# Patient Record
Sex: MALE | Race: WHITE | Employment: OTHER | URBAN - METROPOLITAN AREA
[De-identification: names, ages, dates, MRNs, and addresses within clinical notes are randomized per-mention and may not be internally consistent; named-entity substitution may affect disease eponyms.]

---

## 2022-02-18 ENCOUNTER — APPOINTMENT (OUTPATIENT)
Dept: CT IMAGING | Age: 32
DRG: 552 | End: 2022-02-18
Payer: MEDICARE

## 2022-02-18 ENCOUNTER — HOSPITAL ENCOUNTER (EMERGENCY)
Age: 32
Discharge: HOME | DRG: 552 | End: 2022-02-18
Payer: MEDICARE

## 2022-02-18 VITALS
DIASTOLIC BLOOD PRESSURE: 76 MMHG | RESPIRATION RATE: 16 BRPM | HEART RATE: 76 BPM | HEIGHT: 72 IN | OXYGEN SATURATION: 97 % | WEIGHT: 200 LBS | TEMPERATURE: 98 F | BODY MASS INDEX: 27.09 KG/M2 | SYSTOLIC BLOOD PRESSURE: 127 MMHG

## 2022-02-18 DIAGNOSIS — M54.2 NECK PAIN: Primary | ICD-10-CM

## 2022-02-18 DIAGNOSIS — M48.02 FORAMINAL STENOSIS OF CERVICAL REGION: ICD-10-CM

## 2022-02-18 DIAGNOSIS — M54.12 CERVICAL RADICULOPATHY: ICD-10-CM

## 2022-02-18 PROCEDURE — 96372 THER/PROPH/DIAG INJ SC/IM: CPT

## 2022-02-18 PROCEDURE — 72125 CT NECK SPINE W/O DYE: CPT

## 2022-02-18 PROCEDURE — 99283 EMERGENCY DEPT VISIT LOW MDM: CPT

## 2022-02-18 PROCEDURE — 6360000002 HC RX W HCPCS: Performed by: PHYSICIAN ASSISTANT

## 2022-02-18 RX ORDER — CYCLOBENZAPRINE HCL 10 MG
10 TABLET ORAL 3 TIMES DAILY PRN
Qty: 21 TABLET | Refills: 0 | Status: ON HOLD | OUTPATIENT
Start: 2022-02-18 | End: 2022-02-23 | Stop reason: SDUPTHER

## 2022-02-18 RX ORDER — HYDROCODONE BITARTRATE AND ACETAMINOPHEN 5; 325 MG/1; MG/1
1 TABLET ORAL EVERY 6 HOURS PRN
Qty: 12 TABLET | Refills: 0 | Status: ON HOLD | OUTPATIENT
Start: 2022-02-18 | End: 2022-02-23 | Stop reason: HOSPADM

## 2022-02-18 RX ORDER — KETOROLAC TROMETHAMINE 30 MG/ML
30 INJECTION, SOLUTION INTRAMUSCULAR; INTRAVENOUS ONCE
Status: COMPLETED | OUTPATIENT
Start: 2022-02-18 | End: 2022-02-18

## 2022-02-18 RX ORDER — IBUPROFEN 600 MG/1
600 TABLET ORAL 3 TIMES DAILY PRN
Qty: 30 TABLET | Refills: 0 | Status: ON HOLD | OUTPATIENT
Start: 2022-02-18 | End: 2022-02-23 | Stop reason: HOSPADM

## 2022-02-18 RX ORDER — ORPHENADRINE CITRATE 30 MG/ML
60 INJECTION INTRAMUSCULAR; INTRAVENOUS ONCE
Status: COMPLETED | OUTPATIENT
Start: 2022-02-18 | End: 2022-02-18

## 2022-02-18 RX ORDER — PREDNISONE 10 MG/1
TABLET ORAL
Qty: 20 TABLET | Refills: 0 | Status: ON HOLD | OUTPATIENT
Start: 2022-02-18 | End: 2022-02-23 | Stop reason: HOSPADM

## 2022-02-18 RX ADMIN — KETOROLAC TROMETHAMINE 30 MG: 30 INJECTION, SOLUTION INTRAMUSCULAR at 18:47

## 2022-02-18 RX ADMIN — ORPHENADRINE CITRATE 60 MG: 30 INJECTION INTRAMUSCULAR; INTRAVENOUS at 18:48

## 2022-02-18 ASSESSMENT — PAIN SCALES - GENERAL
PAINLEVEL_OUTOF10: 5
PAINLEVEL_OUTOF10: 9
PAINLEVEL_OUTOF10: 10

## 2022-02-18 ASSESSMENT — PAIN DESCRIPTION - DESCRIPTORS: DESCRIPTORS: ACHING

## 2022-02-18 ASSESSMENT — PAIN DESCRIPTION - ONSET: ONSET: ON-GOING

## 2022-02-18 ASSESSMENT — PAIN DESCRIPTION - PROGRESSION: CLINICAL_PROGRESSION: GRADUALLY WORSENING

## 2022-02-18 ASSESSMENT — PAIN DESCRIPTION - PAIN TYPE: TYPE: ACUTE PAIN

## 2022-02-18 ASSESSMENT — PAIN - FUNCTIONAL ASSESSMENT: PAIN_FUNCTIONAL_ASSESSMENT: 0-10

## 2022-02-18 ASSESSMENT — PAIN DESCRIPTION - LOCATION: LOCATION: NECK

## 2022-02-18 ASSESSMENT — PAIN DESCRIPTION - FREQUENCY: FREQUENCY: CONTINUOUS

## 2022-02-18 NOTE — ED PROVIDER NOTES
[facility]                                                                                                                                    Department of Emergency Medicine   ED  Provider Note  Admit Date/RoomTime: 2/18/2022  5:56 PM  ED Room: 06/06        HPI:  2/18/22,   Time: 6:28 PM KEL Welsh is a 33 y.o. male presenting to the ED for neck pain, beginning 3 days ago. The complaint has been persistent, severe in severity, and worsened by any movement of head/neck. The patient states that his pain began on Tuesday out of the blue. He reports that it started before bed and then got progressively worse over the past few days. He states that the pain is in the left scapular region and the lower portion of his neck. It does radiate down his left upper extremity. He denies any fall or trauma. No prior history of neck pain. Denies any weakness. His pain is aggravated with any movement. Reports mild numbness left UE. States he has been using Tylenol for pain without relief. Feels fine otherwise. Denies fever, chills or vomiting. ROS:     Constitutional: Negative for fever and chills  HENT: Negative for ear pain, sore throat and sinus pressure  Eyes: Negative for pain, discharge and redness  Respiratory:  Negative for shortness of breath, cough and wheezing  Cardiovascular: Negative for CP, edema or palpitations  Gastrointestinal: Negative for nausea, vomiting, diarrhea and abdominal distention  Genitourinary: Negative for dysuria and frequency  Musculoskeletal:  See HPI  Skin: Negative for rash and wound  Neurological: Negative for weakness and headaches  Hematological: Negative for adenopathy    All other systems reviewed and are negative      -------------------------------- PAST HISTORY ----------------------------------  Past Medical History:  has a past medical history of Crohn disease ([facility] [address]     Past Surgical History:  has no past surgical history on file.    Social History:  reports that he has been smoking. He has been smoking about 1.00 pack per day. He has never used smokeless tobacco. He reports current alcohol use. He reports current drug use. Drug: Marijuana Jimmy Amador). Family History: family history is not on file. The patients home medications have been reviewed. Allergies: Patient has no known allergies. --------------------------------- RESULTS ------------------------------------------  All laboratory and radiology results have been personally reviewed by myself   LABS:  No results found for this visit on 02/18/22. RADIOLOGY:  Interpreted by Radiologist.  MelitonAbigail Narayan GameMaki   Final Result   1. No acute fracture or subluxation. 2. Left neural foraminal narrowing at C5-6 secondary to uncovertebral   hypertrophy.             ----------------- NURSING NOTES AND VITALS REVIEWED ---------------   The nursing notes within the ED encounter and vital signs as below have been reviewed. /82   Pulse 71   Temp 97.7 °F (36.5 °C) (Oral)   Resp 16   Ht 6' (1.829 m)   Wt 200 lb (90.7 kg)   SpO2 97%   BMI 27.12 kg/m²   Oxygen Saturation Interpretation: Normal      --------------------------------PHYSICAL EXAM------------------------------------      Constitutional/General: Alert and oriented x3, well appearing, non toxic in NAD  Head: NC/AT  Eyes: PERRL, EOMI  Mouth: Oropharynx clear, handling secretions, no trismus  Neck: Supple, full ROM, no meningeal signs  Pulmonary: Lungs clear to auscultation bilaterally, no wheezes, rales, or rhonchi. Not in respiratory distress  Cardiovascular:  Regular rate and rhythm, no murmurs, gallops, or rubs. 2+ distal pulses  Abdomen: Soft, + BS. No distension. Nontender. No palpable rigidity, rebound or guarding  Extremities: Moves all extremities x 4. Warm and well perfused  Skin: warm and dry without rash  Neurologic: GCS 15,  Intact.   No focal deficits  Psych: Normal Affect      ------------------------ ED COURSE/MEDICAL DECISION MAKING----------------------  Medications   ketorolac (TORADOL) injection 30 mg (30 mg IntraMUSCular Given 2/18/22 1847)   orphenadrine (NORFLEX) injection 60 mg (60 mg IntraMUSCular Given 2/18/22 1848)         Medical Decision Making:    Pt given Toradol and Decadron here. Reviewed CT findings. He does have left neuro foraminal narrowing at C 5-6. Likely will need MRI if pain persists. Plan steroids, muscle relaxors and NSAIDs for home. Needs to F/U PCP. Discussed need for follow-up       Counseling: The emergency provider has spoken with the patient and discussed todays results, in addition to providing specific details for the plan of care and counseling regarding the diagnosis and prognosis. Questions are answered at this time and they are agreeable with the plan.      ------------------------ IMPRESSION AND DISPOSITION -------------------------------    IMPRESSION  1. Neck pain    2. Cervical radiculopathy    3.  Foraminal stenosis of cervical region        DISPOSITION  Disposition: Discharge to home  Patient condition is stable                   Lydia Alejo PA-C  02/18/22 1918

## 2022-02-20 ENCOUNTER — HOSPITAL ENCOUNTER (INPATIENT)
Age: 32
LOS: 3 days | Discharge: HOME | DRG: 552 | End: 2022-02-23
Attending: EMERGENCY MEDICINE | Admitting: INTERNAL MEDICINE
Payer: MEDICARE

## 2022-02-20 ENCOUNTER — APPOINTMENT (OUTPATIENT)
Dept: MRI IMAGING | Age: 32
DRG: 552 | End: 2022-02-20
Payer: MEDICARE

## 2022-02-20 ENCOUNTER — APPOINTMENT (OUTPATIENT)
Dept: CT IMAGING | Age: 32
DRG: 552 | End: 2022-02-20
Payer: MEDICARE

## 2022-02-20 ENCOUNTER — APPOINTMENT (OUTPATIENT)
Dept: GENERAL RADIOLOGY | Age: 32
DRG: 552 | End: 2022-02-20
Payer: MEDICARE

## 2022-02-20 DIAGNOSIS — M54.12 CERVICAL RADICULOPATHY: Primary | ICD-10-CM

## 2022-02-20 PROBLEM — M54.9 BACK PAIN: Status: ACTIVE | Noted: 2022-02-20

## 2022-02-20 PROBLEM — R29.898 UPPER EXTREMITY WEAKNESS: Status: ACTIVE | Noted: 2022-02-20

## 2022-02-20 LAB
ALBUMIN SERPL-MCNC: 4.9 G/DL (ref 3.5–5.2)
ALP BLD-CCNC: 58 U/L (ref 40–129)
ALT SERPL-CCNC: 11 U/L (ref 0–40)
ANION GAP SERPL CALCULATED.3IONS-SCNC: 11 MMOL/L (ref 7–16)
ANION GAP SERPL CALCULATED.3IONS-SCNC: 14 MMOL/L (ref 7–16)
AST SERPL-CCNC: 13 U/L (ref 0–39)
BASOPHILS ABSOLUTE: 0.02 E9/L (ref 0–0.2)
BASOPHILS ABSOLUTE: 0.03 E9/L (ref 0–0.2)
BASOPHILS RELATIVE PERCENT: 0.2 % (ref 0–2)
BASOPHILS RELATIVE PERCENT: 0.3 % (ref 0–2)
BILIRUB SERPL-MCNC: 0.2 MG/DL (ref 0–1.2)
BUN BLDV-MCNC: 13 MG/DL (ref 6–20)
BUN BLDV-MCNC: 16 MG/DL (ref 6–20)
C-REACTIVE PROTEIN: 0.3 MG/DL (ref 0–0.4)
CALCIUM SERPL-MCNC: 9.5 MG/DL (ref 8.6–10.2)
CALCIUM SERPL-MCNC: 9.6 MG/DL (ref 8.6–10.2)
CHLORIDE BLD-SCNC: 103 MMOL/L (ref 98–107)
CHLORIDE BLD-SCNC: 105 MMOL/L (ref 98–107)
CO2: 21 MMOL/L (ref 22–29)
CO2: 24 MMOL/L (ref 22–29)
CREAT SERPL-MCNC: 0.7 MG/DL (ref 0.7–1.2)
CREAT SERPL-MCNC: 0.8 MG/DL (ref 0.7–1.2)
EOSINOPHILS ABSOLUTE: 0.01 E9/L (ref 0.05–0.5)
EOSINOPHILS ABSOLUTE: 0.05 E9/L (ref 0.05–0.5)
EOSINOPHILS RELATIVE PERCENT: 0.1 % (ref 0–6)
EOSINOPHILS RELATIVE PERCENT: 0.4 % (ref 0–6)
GFR AFRICAN AMERICAN: >60
GFR AFRICAN AMERICAN: >60
GFR NON-AFRICAN AMERICAN: >60 ML/MIN/1.73
GFR NON-AFRICAN AMERICAN: >60 ML/MIN/1.73
GLUCOSE BLD-MCNC: 112 MG/DL (ref 74–99)
GLUCOSE BLD-MCNC: 128 MG/DL (ref 74–99)
HCT VFR BLD CALC: 40.4 % (ref 37–54)
HCT VFR BLD CALC: 44.5 % (ref 37–54)
HEMOGLOBIN: 13.8 G/DL (ref 12.5–16.5)
HEMOGLOBIN: 14.9 G/DL (ref 12.5–16.5)
IMMATURE GRANULOCYTES #: 0.03 E9/L
IMMATURE GRANULOCYTES #: 0.08 E9/L
IMMATURE GRANULOCYTES %: 0.3 % (ref 0–5)
IMMATURE GRANULOCYTES %: 0.6 % (ref 0–5)
LYMPHOCYTES ABSOLUTE: 1.09 E9/L (ref 1.5–4)
LYMPHOCYTES ABSOLUTE: 2.06 E9/L (ref 1.5–4)
LYMPHOCYTES RELATIVE PERCENT: 18.5 % (ref 20–42)
LYMPHOCYTES RELATIVE PERCENT: 8.2 % (ref 20–42)
MCH RBC QN AUTO: 30.4 PG (ref 26–35)
MCH RBC QN AUTO: 31 PG (ref 26–35)
MCHC RBC AUTO-ENTMCNC: 33.5 % (ref 32–34.5)
MCHC RBC AUTO-ENTMCNC: 34.2 % (ref 32–34.5)
MCV RBC AUTO: 90.8 FL (ref 80–99.9)
MCV RBC AUTO: 90.8 FL (ref 80–99.9)
MONOCYTES ABSOLUTE: 0.19 E9/L (ref 0.1–0.95)
MONOCYTES ABSOLUTE: 0.74 E9/L (ref 0.1–0.95)
MONOCYTES RELATIVE PERCENT: 1.4 % (ref 2–12)
MONOCYTES RELATIVE PERCENT: 6.7 % (ref 2–12)
NEUTROPHILS ABSOLUTE: 11.89 E9/L (ref 1.8–7.3)
NEUTROPHILS ABSOLUTE: 8.21 E9/L (ref 1.8–7.3)
NEUTROPHILS RELATIVE PERCENT: 73.8 % (ref 43–80)
NEUTROPHILS RELATIVE PERCENT: 89.5 % (ref 43–80)
PDW BLD-RTO: 11.9 FL (ref 11.5–15)
PDW BLD-RTO: 11.9 FL (ref 11.5–15)
PLATELET # BLD: 245 E9/L (ref 130–450)
PLATELET # BLD: 274 E9/L (ref 130–450)
PMV BLD AUTO: 11.5 FL (ref 7–12)
PMV BLD AUTO: 11.8 FL (ref 7–12)
POTASSIUM SERPL-SCNC: 3.8 MMOL/L (ref 3.5–5)
POTASSIUM SERPL-SCNC: 4.2 MMOL/L (ref 3.5–5)
RBC # BLD: 4.45 E12/L (ref 3.8–5.8)
RBC # BLD: 4.9 E12/L (ref 3.8–5.8)
SEDIMENTATION RATE, ERYTHROCYTE: 17 MM/HR (ref 0–15)
SODIUM BLD-SCNC: 138 MMOL/L (ref 132–146)
SODIUM BLD-SCNC: 140 MMOL/L (ref 132–146)
TOTAL CK: 204 U/L (ref 20–200)
TOTAL PROTEIN: 7.8 G/DL (ref 6.4–8.3)
WBC # BLD: 11.1 E9/L (ref 4.5–11.5)
WBC # BLD: 13.3 E9/L (ref 4.5–11.5)

## 2022-02-20 PROCEDURE — 2580000003 HC RX 258: Performed by: STUDENT IN AN ORGANIZED HEALTH CARE EDUCATION/TRAINING PROGRAM

## 2022-02-20 PROCEDURE — 6370000000 HC RX 637 (ALT 250 FOR IP): Performed by: EMERGENCY MEDICINE

## 2022-02-20 PROCEDURE — 86140 C-REACTIVE PROTEIN: CPT

## 2022-02-20 PROCEDURE — 80053 COMPREHEN METABOLIC PANEL: CPT

## 2022-02-20 PROCEDURE — 85025 COMPLETE CBC W/AUTO DIFF WBC: CPT

## 2022-02-20 PROCEDURE — 85651 RBC SED RATE NONAUTOMATED: CPT

## 2022-02-20 PROCEDURE — 72146 MRI CHEST SPINE W/O DYE: CPT

## 2022-02-20 PROCEDURE — 36415 COLL VENOUS BLD VENIPUNCTURE: CPT

## 2022-02-20 PROCEDURE — 71045 X-RAY EXAM CHEST 1 VIEW: CPT

## 2022-02-20 PROCEDURE — 99285 EMERGENCY DEPT VISIT HI MDM: CPT

## 2022-02-20 PROCEDURE — 80048 BASIC METABOLIC PNL TOTAL CA: CPT

## 2022-02-20 PROCEDURE — 6370000000 HC RX 637 (ALT 250 FOR IP): Performed by: STUDENT IN AN ORGANIZED HEALTH CARE EDUCATION/TRAINING PROGRAM

## 2022-02-20 PROCEDURE — 99222 1ST HOSP IP/OBS MODERATE 55: CPT | Performed by: NEUROLOGICAL SURGERY

## 2022-02-20 PROCEDURE — 6360000002 HC RX W HCPCS: Performed by: EMERGENCY MEDICINE

## 2022-02-20 PROCEDURE — 6360000002 HC RX W HCPCS: Performed by: STUDENT IN AN ORGANIZED HEALTH CARE EDUCATION/TRAINING PROGRAM

## 2022-02-20 PROCEDURE — 96374 THER/PROPH/DIAG INJ IV PUSH: CPT

## 2022-02-20 PROCEDURE — 1200000000 HC SEMI PRIVATE

## 2022-02-20 PROCEDURE — 6370000000 HC RX 637 (ALT 250 FOR IP): Performed by: INTERNAL MEDICINE

## 2022-02-20 PROCEDURE — 82550 ASSAY OF CK (CPK): CPT

## 2022-02-20 PROCEDURE — 96375 TX/PRO/DX INJ NEW DRUG ADDON: CPT

## 2022-02-20 PROCEDURE — 6360000004 HC RX CONTRAST MEDICATION: Performed by: RADIOLOGY

## 2022-02-20 PROCEDURE — 96372 THER/PROPH/DIAG INJ SC/IM: CPT

## 2022-02-20 PROCEDURE — 71275 CT ANGIOGRAPHY CHEST: CPT

## 2022-02-20 PROCEDURE — 72141 MRI NECK SPINE W/O DYE: CPT

## 2022-02-20 RX ORDER — KETOROLAC TROMETHAMINE 30 MG/ML
15 INJECTION, SOLUTION INTRAMUSCULAR; INTRAVENOUS ONCE
Status: COMPLETED | OUTPATIENT
Start: 2022-02-20 | End: 2022-02-20

## 2022-02-20 RX ORDER — CYCLOBENZAPRINE HCL 10 MG
10 TABLET ORAL 3 TIMES DAILY PRN
Status: DISCONTINUED | OUTPATIENT
Start: 2022-02-20 | End: 2022-02-23 | Stop reason: HOSPADM

## 2022-02-20 RX ORDER — SODIUM CHLORIDE 9 MG/ML
25 INJECTION, SOLUTION INTRAVENOUS PRN
Status: DISCONTINUED | OUTPATIENT
Start: 2022-02-20 | End: 2022-02-23 | Stop reason: HOSPADM

## 2022-02-20 RX ORDER — ONDANSETRON 4 MG/1
4 TABLET, ORALLY DISINTEGRATING ORAL EVERY 8 HOURS PRN
Status: DISCONTINUED | OUTPATIENT
Start: 2022-02-20 | End: 2022-02-23 | Stop reason: HOSPADM

## 2022-02-20 RX ORDER — ORPHENADRINE CITRATE 30 MG/ML
60 INJECTION INTRAMUSCULAR; INTRAVENOUS ONCE
Status: COMPLETED | OUTPATIENT
Start: 2022-02-20 | End: 2022-02-20

## 2022-02-20 RX ORDER — POLYETHYLENE GLYCOL 3350 17 G/17G
17 POWDER, FOR SOLUTION ORAL DAILY PRN
Status: DISCONTINUED | OUTPATIENT
Start: 2022-02-20 | End: 2022-02-23 | Stop reason: HOSPADM

## 2022-02-20 RX ORDER — LORAZEPAM 2 MG/ML
1 INJECTION INTRAMUSCULAR ONCE
Status: COMPLETED | OUTPATIENT
Start: 2022-02-20 | End: 2022-02-20

## 2022-02-20 RX ORDER — ACETAMINOPHEN 325 MG/1
650 TABLET ORAL EVERY 6 HOURS PRN
Status: DISCONTINUED | OUTPATIENT
Start: 2022-02-20 | End: 2022-02-23 | Stop reason: HOSPADM

## 2022-02-20 RX ORDER — SODIUM CHLORIDE 0.9 % (FLUSH) 0.9 %
5-40 SYRINGE (ML) INJECTION PRN
Status: DISCONTINUED | OUTPATIENT
Start: 2022-02-20 | End: 2022-02-23 | Stop reason: HOSPADM

## 2022-02-20 RX ORDER — SODIUM CHLORIDE 0.9 % (FLUSH) 0.9 %
5-40 SYRINGE (ML) INJECTION EVERY 12 HOURS SCHEDULED
Status: DISCONTINUED | OUTPATIENT
Start: 2022-02-20 | End: 2022-02-23 | Stop reason: HOSPADM

## 2022-02-20 RX ORDER — ACETAMINOPHEN 650 MG/1
650 SUPPOSITORY RECTAL EVERY 6 HOURS PRN
Status: DISCONTINUED | OUTPATIENT
Start: 2022-02-20 | End: 2022-02-23 | Stop reason: HOSPADM

## 2022-02-20 RX ORDER — HYDROCODONE BITARTRATE AND ACETAMINOPHEN 5; 325 MG/1; MG/1
1 TABLET ORAL ONCE
Status: COMPLETED | OUTPATIENT
Start: 2022-02-20 | End: 2022-02-20

## 2022-02-20 RX ORDER — ONDANSETRON 2 MG/ML
4 INJECTION INTRAMUSCULAR; INTRAVENOUS EVERY 6 HOURS PRN
Status: DISCONTINUED | OUTPATIENT
Start: 2022-02-20 | End: 2022-02-23 | Stop reason: HOSPADM

## 2022-02-20 RX ORDER — DEXAMETHASONE SODIUM PHOSPHATE 10 MG/ML
10 INJECTION INTRAMUSCULAR; INTRAVENOUS ONCE
Status: COMPLETED | OUTPATIENT
Start: 2022-02-20 | End: 2022-02-20

## 2022-02-20 RX ORDER — NICOTINE 21 MG/24HR
1 PATCH, TRANSDERMAL 24 HOURS TRANSDERMAL DAILY
Status: DISCONTINUED | OUTPATIENT
Start: 2022-02-20 | End: 2022-02-22

## 2022-02-20 RX ADMIN — CYCLOBENZAPRINE 10 MG: 10 TABLET, FILM COATED ORAL at 16:28

## 2022-02-20 RX ADMIN — LORAZEPAM 1 MG: 2 INJECTION INTRAMUSCULAR; INTRAVENOUS at 04:37

## 2022-02-20 RX ADMIN — KETOROLAC TROMETHAMINE 15 MG: 30 INJECTION, SOLUTION INTRAMUSCULAR; INTRAVENOUS at 21:54

## 2022-02-20 RX ADMIN — SODIUM CHLORIDE, PRESERVATIVE FREE 10 ML: 5 INJECTION INTRAVENOUS at 21:55

## 2022-02-20 RX ADMIN — IOPAMIDOL 90 ML: 755 INJECTION, SOLUTION INTRAVENOUS at 12:13

## 2022-02-20 RX ADMIN — DEXAMETHASONE SODIUM PHOSPHATE 10 MG: 10 INJECTION INTRAMUSCULAR; INTRAVENOUS at 11:24

## 2022-02-20 RX ADMIN — ENOXAPARIN SODIUM 40 MG: 100 INJECTION SUBCUTANEOUS at 16:28

## 2022-02-20 RX ADMIN — HYDROCODONE BITARTRATE AND ACETAMINOPHEN 1 TABLET: 5; 325 TABLET ORAL at 04:38

## 2022-02-20 RX ADMIN — CYCLOBENZAPRINE 10 MG: 10 TABLET, FILM COATED ORAL at 21:54

## 2022-02-20 RX ADMIN — HYDROCODONE BITARTRATE AND ACETAMINOPHEN 1 TABLET: 5; 325 TABLET ORAL at 20:34

## 2022-02-20 RX ADMIN — KETOROLAC TROMETHAMINE 15 MG: 30 INJECTION, SOLUTION INTRAMUSCULAR at 04:37

## 2022-02-20 RX ADMIN — ORPHENADRINE CITRATE 60 MG: 60 INJECTION INTRAMUSCULAR; INTRAVENOUS at 11:31

## 2022-02-20 ASSESSMENT — ENCOUNTER SYMPTOMS
EYE REDNESS: 0
SINUS PRESSURE: 0
BACK PAIN: 1
VOMITING: 0
WHEEZING: 0
SHORTNESS OF BREATH: 0
COUGH: 0
NAUSEA: 0
ABDOMINAL PAIN: 0
EYE DISCHARGE: 0
DIARRHEA: 0
SORE THROAT: 0
EYE PAIN: 0

## 2022-02-20 ASSESSMENT — PAIN DESCRIPTION - FREQUENCY: FREQUENCY: CONTINUOUS

## 2022-02-20 ASSESSMENT — PAIN SCALES - GENERAL
PAINLEVEL_OUTOF10: 9
PAINLEVEL_OUTOF10: 0
PAINLEVEL_OUTOF10: 10
PAINLEVEL_OUTOF10: 10

## 2022-02-20 ASSESSMENT — PAIN - FUNCTIONAL ASSESSMENT: PAIN_FUNCTIONAL_ASSESSMENT: PREVENTS OR INTERFERES SOME ACTIVE ACTIVITIES AND ADLS

## 2022-02-20 ASSESSMENT — PAIN DESCRIPTION - ONSET: ONSET: ON-GOING

## 2022-02-20 ASSESSMENT — PAIN DESCRIPTION - LOCATION: LOCATION: NECK

## 2022-02-20 ASSESSMENT — PAIN DESCRIPTION - PROGRESSION: CLINICAL_PROGRESSION: NOT CHANGED

## 2022-02-20 ASSESSMENT — PAIN DESCRIPTION - PAIN TYPE: TYPE: ACUTE PAIN

## 2022-02-20 ASSESSMENT — PAIN DESCRIPTION - ORIENTATION: ORIENTATION: LEFT

## 2022-02-20 NOTE — ED NOTES
Bed: 17B-17  Expected date:   Expected time:   Means of arrival:   Comments:  lanes Maron Cough, RN  02/20/22 0095

## 2022-02-20 NOTE — ED PROVIDER NOTES
Chief Complaint   Patient presents with    Back Pain     Seen at [facility] ED yesterday where discovered T5 bone on bone. Today experiencing numbness of lt arm, neck pain, and back pain, having trouble lifting lt arm up. 77-year-old male with noncontributory past medical history presenting today with worsening neck, upper back, left arm pain and numbness. The pain started on Tuesday before bed with no inciting event, it is not worsening since then, nothing is made it better, any movement makes it worse, associated with generalized numbness and tingling. He is not experiencing weakness but it does hurt on range of motion. No sensation changes and radial pulses equal.  He denies ever experiencing trauma to his neck or back, he has never experienced this before. He works intermittently as a  and is right-handed. He was seen last time emergency department on 2/18 for the same complaint, CT neck was done that showed neuroforaminal narrowing at C5 and C6. He was discharged with steroids and muscle relaxers, he states that he has only had time to take one dose after discharge but the pain is worsening and he came back in for reevaluation. Review of Systems   Constitutional: Negative for chills and fever. HENT: Negative for ear pain, sinus pressure and sore throat. Eyes: Negative for pain, discharge and redness. Respiratory: Negative for cough, shortness of breath and wheezing. Cardiovascular: Negative for chest pain. Gastrointestinal: Negative for abdominal pain, diarrhea, nausea and vomiting. Genitourinary: Negative for dysuria and frequency. Musculoskeletal: Positive for back pain and neck pain. Negative for arthralgias. Left arm pain, numbness, tingling   Skin: Negative for rash and wound. Neurological: Negative for weakness and headaches. Hematological: Negative for adenopathy. All other systems reviewed and are negative.        Physical Exam  Vitals and nursing note reviewed. Constitutional:       General: He is not in acute distress. Appearance: He is well-developed. HENT:      Head: Normocephalic and atraumatic. Right Ear: External ear normal.      Left Ear: External ear normal.      Mouth/Throat:      Mouth: Mucous membranes are moist.      Pharynx: Oropharynx is clear. Eyes:      Pupils: Pupils are equal, round, and reactive to light. Cardiovascular:      Rate and Rhythm: Regular rhythm. Tachycardia present. Heart sounds: Normal heart sounds. No murmur heard. Pulmonary:      Effort: Pulmonary effort is normal. No respiratory distress. Breath sounds: Normal breath sounds. No wheezing. Abdominal:      General: Bowel sounds are normal.      Palpations: Abdomen is soft. Tenderness: There is no abdominal tenderness. There is no guarding or rebound. Musculoskeletal:         General: Tenderness present. Cervical back: Normal range of motion and neck supple. Comments: Tenderness with abduction of left shoulder, sensation equal bilaterally and radial pulses +2/4   Skin:     General: Skin is warm and dry. Findings: No bruising, lesion or rash. Neurological:      General: No focal deficit present. Mental Status: He is alert and oriented to person, place, and time. Cranial Nerves: No cranial nerve deficit. Procedures     MDM  Number of Diagnoses or Management Options  Cervical radiculopathy  Diagnosis management comments: 47-year-old male with noncontributory past medical history presents with worsening neck, back, left arm pain and numbness. He recently tachycardic on arrival to emergency department otherwise hemodynamically stable. Will be treated symptomatically with Norco, Toradol, Ativan. Will obtain MRIs. MRI cervical spine demonstrated disc osteophyte complex at C5/C6 creating narrowing the left neural formation with no disc and nerve root contact or compromise.   There was a broad-based disc bulge with central protrusion at the C6/C7 level creating mass-effect anteriorly on the thecal sac and spinal cord also with no significant lateralization or narrowing of the neural foramina. Thoracic spine MRI showed a small right-sided disc protrusion at T7/T8 creating minimal mass-effect with no evidence of spinal cord compromise. CTA chest was obtained demonstrating mild ectasia of ascending thoracic aorta at 3 cm no evidence of dissection or intimal flap. Patient was signed out to Dr. Alona Slater and neurosurgery was consulted. Dr. Jorge Hutton will accept the patient for further work-up. ED Course as of 02/20/22 2024   [facility] Feb 20, 2022   3589 He is feeling more relaxed with the symptomatic management, arm is still painful but is manageable if he does not move. [MM]   0600 Patient was singed out to Dr. Alona Slater and Dr. Leonila Harmon. [MM]      ED Course User Index  [MM] Dee Dee Negrete DO        ED Course as of 02/20/22 2024   [facility] Feb 20, 2022   7599 He is feeling more relaxed with the symptomatic management, arm is still painful but is manageable if he does not move. [MM]   0600 Patient was singed out to Dr. Alona Slater and Dr. Leonila Harmon. [MM]      ED Course User Index  [MM] Dee Dee Negrete DO       --------------------------------------------- PAST HISTORY ---------------------------------------------  Past Medical History:  has a past medical history of Crohn disease ([facility] [address] Past Surgical History:  has no past surgical history on file. Social History:  reports that he has been smoking. He has been smoking about 1.00 pack per day. He has never used smokeless tobacco. He reports current alcohol use. He reports current drug use. Drug: Marijuana Yanique Peals). Family History: family history is not on file. The patients home medications have been reviewed. Allergies: Patient has no known allergies.     -------------------------------------------------- RESULTS -------------------------------------------------    LABS:  Results for orders placed or performed during the hospital encounter of 02/20/22   Sedimentation Rate   Result Value Ref Range    Sed Rate 17 (H) 0 - 15 mm/Hr   C-Reactive Protein   Result Value Ref Range    CRP 0.3 0.0 - 0.4 mg/dL   CK   Result Value Ref Range    Total  (H) 20 - 200 U/L   CBC with Auto Differential   Result Value Ref Range    WBC 11.1 4.5 - 11.5 E9/L    RBC 4.45 3.80 - 5.80 E12/L    Hemoglobin 13.8 12.5 - 16.5 g/dL    Hematocrit 40.4 37.0 - 54.0 %    MCV 90.8 80.0 - 99.9 fL    MCH 31.0 26.0 - 35.0 pg    MCHC 34.2 32.0 - 34.5 %    RDW 11.9 11.5 - 15.0 fL    Platelets 588 507 - 273 E9/L    MPV 11.5 7.0 - 12.0 fL    Neutrophils % 73.8 43.0 - 80.0 %    Immature Granulocytes % 0.3 0.0 - 5.0 %    Lymphocytes % 18.5 (L) 20.0 - 42.0 %    Monocytes % 6.7 2.0 - 12.0 %    Eosinophils % 0.4 0.0 - 6.0 %    Basophils % 0.3 0.0 - 2.0 %    Neutrophils Absolute 8.21 (H) 1.80 - 7.30 E9/L    Immature Granulocytes # 0.03 E9/L    Lymphocytes Absolute 2.06 1.50 - 4.00 E9/L    Monocytes Absolute 0.74 0.10 - 0.95 E9/L    Eosinophils Absolute 0.05 0.05 - 0.50 E9/L    Basophils Absolute 0.03 0.00 - 0.20 E9/L   Comprehensive Metabolic Panel   Result Value Ref Range    Sodium 140 132 - 146 mmol/L    Potassium 3.8 3.5 - 5.0 mmol/L    Chloride 105 98 - 107 mmol/L    CO2 24 22 - 29 mmol/L    Anion Gap 11 7 - 16 mmol/L    Glucose 112 (H) 74 - 99 mg/dL    BUN 16 6 - 20 mg/dL    CREATININE 0.8 0.7 - 1.2 mg/dL    GFR Non-African American >60 >=60 mL/min/1.73    GFR African American >60     Calcium 9.6 8.6 - 10.2 mg/dL    Total Protein 7.8 6.4 - 8.3 g/dL    Albumin 4.9 3.5 - 5.2 g/dL    Total Bilirubin 0.2 0.0 - 1.2 mg/dL    Alkaline Phosphatase 58 40 - 129 U/L    ALT 11 0 - 40 U/L    AST 13 0 - 39 U/L   CBC with Auto Differential   Result Value Ref Range    WBC 13.3 (H) 4.5 - 11.5 E9/L    RBC 4.90 3.80 - 5.80 E12/L    Hemoglobin 14.9 12.5 - 16.5 g/dL    Hematocrit 44.5 37.0 - 54.0 %    MCV 90.8 80.0 - 99.9 fL    MCH 30.4 26.0 - 35.0 pg    MCHC 33.5 32.0 - 34.5 %    RDW 11.9 11.5 - 15.0 fL    Platelets 822 482 - 905 E9/L    MPV 11.8 7.0 - 12.0 fL    Neutrophils % 89.5 (H) 43.0 - 80.0 %    Immature Granulocytes % 0.6 0.0 - 5.0 %    Lymphocytes % 8.2 (L) 20.0 - 42.0 %    Monocytes % 1.4 (L) 2.0 - 12.0 %    Eosinophils % 0.1 0.0 - 6.0 %    Basophils % 0.2 0.0 - 2.0 %    Neutrophils Absolute 11.89 (H) 1.80 - 7.30 E9/L    Immature Granulocytes # 0.08 E9/L    Lymphocytes Absolute 1.09 (L) 1.50 - 4.00 E9/L    Monocytes Absolute 0.19 0.10 - 0.95 E9/L    Eosinophils Absolute 0.01 (L) 0.05 - 0.50 E9/L    Basophils Absolute 0.02 0.00 - 0.20 Q7/Q   Basic Metabolic Panel   Result Value Ref Range    Sodium 138 132 - 146 mmol/L    Potassium 4.2 3.5 - 5.0 mmol/L    Chloride 103 98 - 107 mmol/L    CO2 21 (L) 22 - 29 mmol/L    Anion Gap 14 7 - 16 mmol/L    Glucose 128 (H) 74 - 99 mg/dL    BUN 13 6 - 20 mg/dL    CREATININE 0.7 0.7 - 1.2 mg/dL    GFR Non-African American >60 >=60 mL/min/1.73    GFR African American >60     Calcium 9.5 8.6 - 10.2 mg/dL       RADIOLOGY:  CTA CHEST W CONTRAST   Final Result   Mild ectasia of the ascending thoracic aorta at 3.0 cm with no evidence of   dissection or intimal flap. Minimal scarring at the left lung base with no   acute intrathoracic abnormality. XR CHEST PORTABLE   Final Result   No acute process. MRI THORACIC SPINE WO CONTRAST   Final Result   Small right-sided disc protrusion at the T7/T8 level creating minimal mass   effect on the thecal sac and spinal cord with no evidence of nerve root   contact or spinal cord compromise. No abnormal signal.  The remainder of the   thoracic spine is grossly unremarkable. MRI CERVICAL SPINE WO CONTRAST   Final Result   There is a disc osteophyte complex on the left at the C5/C6 level creating   some narrowing of the left neural foramina with no definite nerve root   contact or compromise. Broad-based disc bulge with central protrusion at the C6/C7 level creating   mass effect anteriorly on the thecal sac and spinal cord with no significant   lateralization or narrowing of the neural foramina. No definite nerve root   compromise present.             ------------------------- NURSING NOTES AND VITALS REVIEWED ---------------------------  Date / Time Roomed:  2/20/2022  4:16 AM  ED Bed Assignment:  [id number]    The nursing notes within the ED encounter and vital signs as below have been reviewed. Patient Vitals for the past 24 hrs:   BP Temp Temp src Pulse Resp SpO2 Height Weight   02/20/22 1413 126/76 97.3 °F (36.3 °C) Temporal 63 18 -- -- --   02/20/22 1332 121/81 98.3 °F (36.8 °C) -- 89 16 97 % -- --   02/20/22 0419 (!) 137/115 98.5 °F (36.9 °C) Oral 104 18 100 % 6' (1.829 m) 195 lb (88.5 kg)       Oxygen Saturation Interpretation: Normal    ------------------------------------------ PROGRESS NOTES ------------------------------------------  Re-evaluation(s):  Time: 5501  Patients symptoms show no change  Repeat physical examination is not changed. Counseling:  I have spoken with the patient and discussed todays results, in addition to providing specific details for the plan of care and counseling regarding the diagnosis and prognosis. Their questions are answered at this time and they are agreeable with the plan of admission.    --------------------------------- ADDITIONAL PROVIDER NOTES ---------------------------------  Consultations:  Time: 1558. Dr. Aleida Abraham spoke with Dr. Pinky Wilkerson. Discussed case. They will admit the patient. This patient's ED course included: a personal history and physicial examination, re-evaluation prior to disposition, multiple bedside re-evaluations, IV medications and complex medical decision making and emergency management    This patient has remained hemodynamically stable during their ED course. Diagnosis:  1.  Cervical radiculopathy Disposition:  Patient's disposition: Admit to med/surg floor  Patient's condition is stable.            Sam Loo DO  Resident  02/20/22 2024

## 2022-02-20 NOTE — ED PROVIDER NOTES
1:22 PM EST    I received this patient at sign out from Dr. Tomas Romano   I have discussed the patient's initial exam, treatment and plan of care with the out going physician. I have introduced my self to the patient / family and have answered their questions to this point. I have examined the patient myself and reviewed ordered tests / medications and reviewed any available results to this point. If a resident is involved in the Emergency Department care, I have discussed my findings and plan with them as well. Severe left back pain, pain with arm movement, cannot lift above the nipple line without yelling. Has normal hand grasp here. No swelling. Normal pulses. Compartments soft. No chest pain.   Signed out with MRI pending  MRI noted  Neurosurgery consulted  Evaluated by Dr. Zhanna Mayer in the ED, no acute surgical condition at this time, she recommended muscle relaxer and further work up including inflammatory markers, possible EMG and further medical work up    Additional testing ordered  Consult placed to [facility] for admission  Dr. Santillan Fairly accepts for admission      I, Dr. Toño Junior, am the primary provider of record          James Duncan MD  02/20/22 9569

## 2022-02-20 NOTE — CONSULTS
Chief Complaint:   Chief Complaint   Patient presents with    Back Pain     Seen at [facility] ED yesterday where discovered T5 bone on bone. Today experiencing numbness of lt arm, neck pain, and back pain, having trouble lifting lt arm up. HPI:     I had the pleasure of seeing Darius Mendez today in house. As you know this delightful 58-year-old single childless social smoker social drinker and former  presents with a 4-day history of posterior neck pain and left scapular pain. Patient states pain has been progressive in nature but without edna radiculopathy. He does admit to having an internal sense of numbness however there is no sensory changes frankly per the patient. He has had no loss of bowel or bladder. Against this background patient states that his trouble began sometime on Tuesday after playing billiards and then cards and noticed that his shoulder and scapula as well as his neck were hurting spontaneously following day he noted that he was having difficulty moving the arm without eliciting extreme pain and is progressed. Otherwise he denies edna weakness; he has had no history of trauma. He also denies any recent viral illnesses. Past Medical History:   Diagnosis Date    Crohn disease ([facility] [address]      History reviewed. No pertinent surgical history. History reviewed. No pertinent family history. Social History     Socioeconomic History    Marital status: Single     Spouse name: Not on file    Number of children: Not on file    Years of education: Not on file    Highest education level: Not on file   Occupational History    Not on file   Tobacco Use    Smoking status: Current Every Day Smoker     Packs/day: 1.00    Smokeless tobacco: Never Used   Substance and Sexual Activity    Alcohol use:  Yes    Drug use: Yes     Types: Marijuana Yanique Peals)     Comment: often    Sexual activity: Not on file   Other Topics Concern    Not on file   Social History Narrative    Not on file     Social Determinants of Health     Financial Resource Strain:     Difficulty of Paying Living Expenses: Not on file   Food Insecurity:     Worried About Running Out of Food in the Last Year: Not on file    Sasha of Food in the Last Year: Not on file   Transportation Needs:     Lack of Transportation (Medical): Not on file    Lack of Transportation (Non-Medical): Not on file   Physical Activity:     Days of Exercise per Week: Not on file    Minutes of Exercise per Session: Not on file   Stress:     Feeling of Stress : Not on file   Social Connections:     Frequency of Communication with Friends and Family: Not on file    Frequency of Social Gatherings with Friends and Family: Not on file    Attends [employer] Services: Not on file    Active Member of [address] [place] [address] or Organizations: Not on file    Attends Club or Organization Meetings: Not on file    Marital Status: Not on file   Intimate Partner Violence:     Fear of Current or Ex-Partner: Not on file    Emotionally Abused: Not on file    Physically Abused: Not on file    Sexually Abused: Not on file   Housing Stability:     Unable to Pay for Housing in the Last Year: Not on file    Number of [place] in the Last Year: Not on file    Unstable Housing in the Last Year: Not on file       Medications:   Current Facility-Administered Medications   Medication Dose Route Frequency Provider Last Rate Last Admin    ketorolac (TORADOL) injection 15 mg  15 mg IntraVENous Once MD Melissa            Allergies:    Patient has no known allergies. Review of Systems:    Denies any chest pain, headache, dyspnea, recent weight loss, fevers, chills or night sweats. Physical Examination:    /81   Pulse 89   Temp 98.3 °F (36.8 °C)   Resp 16   Ht 6' (1.829 m)   Wt 195 lb (88.5 kg)   SpO2 97%   BMI 26.45 kg/m²      On focused neurological examination, he  is awake alert oriented and rationally conversant.   Speech is clear and fluent. Pupils are equal and reactive to light bilaterally, extraocular movements are intact, visual fields are full to confrontation. His  face is symmetric and grossly intact to fine touch. Uvula and tongue are both midline. Shoulder shrug is symmetric and strong. Palpation of the cervical spine elicits pain and extreme tenderness from approximately C8 3 down through the scapula itself. There is mild tenderness along the trapezius. Range of motion is limited secondary to pain the patient physically was moving his head with his hands in order to accomplish task. Motor examination reveals preserved power in the upper and lower extremities at 5 out of 5 throughout. His exam is limited in left abduction: He can AB duct without pain to approximately 90 degrees and thereafter range of motion is preserved patient stated that he was an extraordinary pain. Reflexes are symmetric and brisk. Plantar responses are downgoing. There is no clonus. Patient is intact to fine touch in all dermatomes throughout. ASSESSMENT:    I personally reviewed Zaria Madrigal's radiographic images, particularly his MRI of the cervical and thoracic spine which demonstrates herniated nucleus pulposus C5-6 and 6 7 but without edna neural compromise especially on the left. MEDICAL DECISION MAKING & PLAN:    Herniated nucleus pulposus however without edna radiculopathy. Patient's tenderness to palpation leans away from an etiology within the spine itself. Suggest full myositis work-up and perhaps even osseous work-up of the scapula shoulder and neck. No neurosurgical intervention is required at this time. Thank you so much for allowing us to participate the care of this patient. Electronically signed by Sharon Barros MD on 2/20/2022 at 1:59 PM       NOTE: This report was transcribed using voice recognition software.  Every effort was made to ensure accuracy; however, inadvertent computerized transcription errors may be present

## 2022-02-21 ENCOUNTER — APPOINTMENT (OUTPATIENT)
Dept: NEUROLOGY | Age: 32
DRG: 552 | End: 2022-02-21
Payer: MEDICARE

## 2022-02-21 LAB
BACTERIA: ABNORMAL /HPF
BILIRUBIN URINE: NEGATIVE
BLOOD, URINE: NEGATIVE
CLARITY: CLEAR
COLOR: YELLOW
EPITHELIAL CELLS, UA: ABNORMAL /HPF
GLUCOSE URINE: NEGATIVE MG/DL
KETONES, URINE: NEGATIVE MG/DL
LEUKOCYTE ESTERASE, URINE: NEGATIVE
MUCUS: PRESENT /LPF
NITRITE, URINE: NEGATIVE
PH UA: 5.5 (ref 5–9)
PROTEIN UA: NEGATIVE MG/DL
RBC UA: ABNORMAL /HPF (ref 0–2)
SPECIFIC GRAVITY UA: 1.02 (ref 1–1.03)
TOTAL CK: 403 U/L (ref 20–200)
TSH SERPL DL<=0.05 MIU/L-ACNC: 1.91 UIU/ML (ref 0.27–4.2)
UROBILINOGEN, URINE: 0.2 E.U./DL
VITAMIN D 25-HYDROXY: 19 NG/ML (ref 30–100)
WBC UA: ABNORMAL /HPF (ref 0–5)

## 2022-02-21 PROCEDURE — 97530 THERAPEUTIC ACTIVITIES: CPT

## 2022-02-21 PROCEDURE — 6370000000 HC RX 637 (ALT 250 FOR IP): Performed by: INTERNAL MEDICINE

## 2022-02-21 PROCEDURE — 82085 ASSAY OF ALDOLASE: CPT

## 2022-02-21 PROCEDURE — 6370000000 HC RX 637 (ALT 250 FOR IP)

## 2022-02-21 PROCEDURE — 95909 NRV CNDJ TST 5-6 STUDIES: CPT

## 2022-02-21 PROCEDURE — 6360000002 HC RX W HCPCS: Performed by: STUDENT IN AN ORGANIZED HEALTH CARE EDUCATION/TRAINING PROGRAM

## 2022-02-21 PROCEDURE — 6360000002 HC RX W HCPCS: Performed by: INTERNAL MEDICINE

## 2022-02-21 PROCEDURE — 95909 NRV CNDJ TST 5-6 STUDIES: CPT | Performed by: PSYCHIATRY & NEUROLOGY

## 2022-02-21 PROCEDURE — 82550 ASSAY OF CK (CPK): CPT

## 2022-02-21 PROCEDURE — 6370000000 HC RX 637 (ALT 250 FOR IP): Performed by: STUDENT IN AN ORGANIZED HEALTH CARE EDUCATION/TRAINING PROGRAM

## 2022-02-21 PROCEDURE — 95886 MUSC TEST DONE W/N TEST COMP: CPT

## 2022-02-21 PROCEDURE — 95886 MUSC TEST DONE W/N TEST COMP: CPT | Performed by: PSYCHIATRY & NEUROLOGY

## 2022-02-21 PROCEDURE — 97161 PT EVAL LOW COMPLEX 20 MIN: CPT

## 2022-02-21 PROCEDURE — 97165 OT EVAL LOW COMPLEX 30 MIN: CPT

## 2022-02-21 PROCEDURE — 82306 VITAMIN D 25 HYDROXY: CPT

## 2022-02-21 PROCEDURE — 36415 COLL VENOUS BLD VENIPUNCTURE: CPT

## 2022-02-21 PROCEDURE — 84443 ASSAY THYROID STIM HORMONE: CPT

## 2022-02-21 PROCEDURE — 2580000003 HC RX 258: Performed by: STUDENT IN AN ORGANIZED HEALTH CARE EDUCATION/TRAINING PROGRAM

## 2022-02-21 PROCEDURE — 81001 URINALYSIS AUTO W/SCOPE: CPT

## 2022-02-21 PROCEDURE — 99233 SBSQ HOSP IP/OBS HIGH 50: CPT | Performed by: INTERNAL MEDICINE

## 2022-02-21 PROCEDURE — 1200000000 HC SEMI PRIVATE

## 2022-02-21 RX ORDER — LORAZEPAM 2 MG/ML
1 INJECTION INTRAMUSCULAR ONCE
Status: COMPLETED | OUTPATIENT
Start: 2022-02-21 | End: 2022-02-21

## 2022-02-21 RX ORDER — GABAPENTIN 300 MG/1
300 CAPSULE ORAL DAILY
Status: COMPLETED | OUTPATIENT
Start: 2022-02-21 | End: 2022-02-23

## 2022-02-21 RX ORDER — KETOROLAC TROMETHAMINE 30 MG/ML
15 INJECTION, SOLUTION INTRAMUSCULAR; INTRAVENOUS
Status: DISCONTINUED | OUTPATIENT
Start: 2022-02-21 | End: 2022-02-21

## 2022-02-21 RX ORDER — PANTOPRAZOLE SODIUM 40 MG/1
40 TABLET, DELAYED RELEASE ORAL
Status: DISCONTINUED | OUTPATIENT
Start: 2022-02-22 | End: 2022-02-23 | Stop reason: HOSPADM

## 2022-02-21 RX ORDER — IBUPROFEN 400 MG/1
400 TABLET ORAL
Status: DISCONTINUED | OUTPATIENT
Start: 2022-02-21 | End: 2022-02-23 | Stop reason: HOSPADM

## 2022-02-21 RX ORDER — DEXAMETHASONE SODIUM PHOSPHATE 4 MG/ML
10 INJECTION, SOLUTION INTRA-ARTICULAR; INTRALESIONAL; INTRAMUSCULAR; INTRAVENOUS; SOFT TISSUE EVERY 24 HOURS
Status: DISCONTINUED | OUTPATIENT
Start: 2022-02-21 | End: 2022-02-22

## 2022-02-21 RX ADMIN — LORAZEPAM 1 MG: 2 INJECTION INTRAMUSCULAR; INTRAVENOUS at 23:09

## 2022-02-21 RX ADMIN — IBUPROFEN 400 MG: 400 TABLET, FILM COATED ORAL at 19:47

## 2022-02-21 RX ADMIN — SODIUM CHLORIDE, PRESERVATIVE FREE 10 ML: 5 INJECTION INTRAVENOUS at 08:36

## 2022-02-21 RX ADMIN — IBUPROFEN 400 MG: 400 TABLET, FILM COATED ORAL at 13:38

## 2022-02-21 RX ADMIN — GABAPENTIN 300 MG: 300 CAPSULE ORAL at 17:30

## 2022-02-21 RX ADMIN — SODIUM CHLORIDE, PRESERVATIVE FREE 10 ML: 5 INJECTION INTRAVENOUS at 20:10

## 2022-02-21 RX ADMIN — ACETAMINOPHEN 650 MG: 325 TABLET ORAL at 08:41

## 2022-02-21 RX ADMIN — DEXAMETHASONE SODIUM PHOSPHATE 10 MG: 4 INJECTION, SOLUTION INTRAMUSCULAR; INTRAVENOUS at 13:38

## 2022-02-21 RX ADMIN — ACETAMINOPHEN 650 MG: 325 TABLET ORAL at 23:17

## 2022-02-21 RX ADMIN — ENOXAPARIN SODIUM 40 MG: 100 INJECTION SUBCUTANEOUS at 08:36

## 2022-02-21 RX ADMIN — CYCLOBENZAPRINE 10 MG: 10 TABLET, FILM COATED ORAL at 20:11

## 2022-02-21 RX ADMIN — CYCLOBENZAPRINE 10 MG: 10 TABLET, FILM COATED ORAL at 08:41

## 2022-02-21 RX ADMIN — ACETAMINOPHEN 650 MG: 325 TABLET ORAL at 17:30

## 2022-02-21 ASSESSMENT — PAIN DESCRIPTION - PAIN TYPE
TYPE: ACUTE PAIN

## 2022-02-21 ASSESSMENT — PAIN DESCRIPTION - FREQUENCY
FREQUENCY: CONTINUOUS

## 2022-02-21 ASSESSMENT — PAIN - FUNCTIONAL ASSESSMENT
PAIN_FUNCTIONAL_ASSESSMENT: PREVENTS OR INTERFERES SOME ACTIVE ACTIVITIES AND ADLS
PAIN_FUNCTIONAL_ASSESSMENT: PREVENTS OR INTERFERES SOME ACTIVE ACTIVITIES AND ADLS

## 2022-02-21 ASSESSMENT — PAIN DESCRIPTION - LOCATION
LOCATION: NECK

## 2022-02-21 ASSESSMENT — PAIN DESCRIPTION - ORIENTATION
ORIENTATION: LEFT
ORIENTATION: LEFT;INNER
ORIENTATION: LEFT;INNER
ORIENTATION: LEFT

## 2022-02-21 ASSESSMENT — PAIN DESCRIPTION - ONSET
ONSET: ON-GOING

## 2022-02-21 ASSESSMENT — PAIN SCALES - GENERAL
PAINLEVEL_OUTOF10: 4
PAINLEVEL_OUTOF10: 7
PAINLEVEL_OUTOF10: 2
PAINLEVEL_OUTOF10: 8
PAINLEVEL_OUTOF10: 7
PAINLEVEL_OUTOF10: 4
PAINLEVEL_OUTOF10: 7
PAINLEVEL_OUTOF10: 8

## 2022-02-21 ASSESSMENT — PAIN DESCRIPTION - PROGRESSION: CLINICAL_PROGRESSION: GRADUALLY WORSENING

## 2022-02-21 ASSESSMENT — PAIN DESCRIPTION - DESCRIPTORS
DESCRIPTORS: SHARP;SHOOTING;PINS AND NEEDLES
DESCRIPTORS: PINS AND NEEDLES;SHARP;SHOOTING
DESCRIPTORS: PINS AND NEEDLES;SHARP;SHOOTING
DESCRIPTORS: ACHING;CONSTANT;RADIATING;NUMBNESS

## 2022-02-21 NOTE — PROGRESS NOTES
Physical Therapy  Physical Therapy Initial Assessment     Name: Guillermo Thomas  : 1990  MRN: [id number]      Date of Service: 2022    Evaluating PT:  Toño Flores PT, DPT [id number]     Room #:  4397/9032-O  Diagnosis:  Back pain [M54.9]  Cervical radiculopathy [M54.12]  Upper extremity weakness [R29.898]  Reason for admission: back pain, LUE pain  Precautions:  Falls, spinal neutral   Procedure/Surgery:  None   Equipment Recommendations:  None, He has no current acute care PT needs. Recommend outpatient physical therapy for rehab to neck and LUE     SUBJECTIVE:  Pt lives with significant other in a 2 story home with 1 XIN no rails -- 2nd floor setup with flight and 1 rail. Pt ambulated with no AD PTA. OBJECTIVE:   Initial Evaluation  Date:    AM-PAC 6 Clicks 44/30   Was pt agreeable to Eval/treatment? Yes    Does pt have pain? Neck, L scapula    Bed Mobility  Rolling: SBA  Supine to sit: SBA  Sit to supine: NT  Scooting: SBA   Transfers Sit to stand: Independent   Stand to sit: Independent   Stand pivot: NT   Ambulation    100 feet with no AD independent     Stair negotiation: ascended and descended  NT     LE ROM: WFL    LE Strength:   knee ext: 5/5  ankle DF: 5/5    Balance:   Sitting static: Independent  Sitting dynamic: Independent  Standing static: Independent  Standing dynamic: Independent      -Pt is A & O x 3  -Sensation:  Pt reports tingling and numbness to LUE shoulder to hand  -Edema:  unremarkable     Therapeutic Exercises:  Functional activity     Patient education  Pt educated on safety, sequencing of transfers, and role of PT    Patient response to education:   Pt verbalized understanding Pt demonstrated skill Pt requires further education in this area   Yes  Yes  No      ASSESSMENT:  Conditions Requiring Skilled Therapeutic Intervention: NA        Comments:  Pt received supine in bed and agreeable to PT session   Educated on spinal neutrality for comfort.  Reviewed transfers in best recommended fashion for pain prevention. Pt completing all mobility without hands on assist and moreover is ambulating independently. He has no current acute care PT needs. Recommend outpatient physical therapy for rehab to neck and LUE   Pt with all needs met and call light in reach. Pt would benefit from continued PT POC to address functional deficits described above. Treatment:  Patient practiced and was instructed in the following treatment:     Therapeutic activity  o Patient education provided continuously throughout session for sequencing, safety maintenance, and improving any deficits found during the evaluation. o Bed mobility training - pt given verbal and tactile cues to facilitate proper sequencing and safety during rolling and supine>sit as well as provided with physical assistance to complete task    o Education on POC, ROM exercises, strengthening, DC recommendations, etc.     Pt's/ family goals   1. Return home     Patient and or family understand(s) diagnosis, prognosis, and plan of care. Yes     Prognosis is good for reaching above PT goals. PHYSICAL THERAPY PLAN OF CARE:    PT POC is established based on physician order and patient diagnosis     Referring provider/PT Order:  02/21/22 0815   PT eval and treat Start: 02/21/22 0815, End: 02/21/22 0815, ONE TIME, Standing Count: 1 Occurrences, R    Helen Gilbert MD    Diagnosis:  Back pain [M54.9]  Cervical radiculopathy [M54.12]  Upper extremity weakness [R29.898]  Specific instructions for next treatment:       *He has no further acute care PT needs.  Recommend outpatient physical therapy for rehab to neck and LUE     Time in  1310  Time out  1340    Total Treatment Time  8 minutes     Evaluation Time includes thorough review of current medical information, gathering information on past medical history/social history and prior level of function, completion of standardized testing/informal observation of tasks, assessment of data and education on plan of care and goals.     CPT codes:  [x] Low Complexity PT evaluation 15031  [] Moderate Complexity PT evaluation 65697  [] High Complexity PT evaluation 13766  [] PT Re-evaluation 75261  [] Gait training 07675 - minutes  [] Manual therapy 15230 - minutes  [x] Therapeutic activities 69091 8 minutes  [] Therapeutic exercises 79623 - minutes  [] Neuromuscular reeducation 71985 - minutes     Reji Atwood, PT, DPT  [id number]

## 2022-02-21 NOTE — PLAN OF CARE
Problem: Pain:  Goal: Pain level will decrease  Description: Pain level will decrease  2/21/2022 1605 by Yeimi Quintana RN  Outcome: Met This Shift     Problem: Pain:  Goal: Control of acute pain  Description: Control of acute pain  2/21/2022 1605 by Yeimi Quintana RN  Outcome: Met This Shift     Problem: Musculor/Skeletal Functional Status  Goal: Absence of falls  Outcome: Met This Shift

## 2022-02-21 NOTE — PLAN OF CARE
Discussed pain resolution expectations. Notified pain management of consult.   Obtained orders for pain meds for acute pain

## 2022-02-21 NOTE — CARE COORDINATION
2/21/22 Transition of Care: Met with patient and girlfriend at the bedside. He is alert and oriented. He resides with his girl friend. He does drive and is independent. He does not work currently. His pcp is Dr Gerhardt Osler and his pharmacy is [facility]. His plan is to return home. He currently is undergoing an EMG and other testing per neurology. Will follow for readiness to discharge and further needs.  Electronically signed by Aleda Boas, RN CM on 2/21/2022 at 2:14 PM 25

## 2022-02-21 NOTE — PLAN OF CARE
Problem: Pain:  Goal: Pain level will decrease  Description: Pain level will decrease  2/21/2022 1013 by Iker Gardner RN  Outcome: Met This Shift  2/20/2022 2331 by Jenifer De Anda RN  Outcome: Not Met This Shift  Goal: Control of acute pain  Description: Control of acute pain  2/21/2022 1013 by Iker Gardner RN  Outcome: Met This Shift  2/20/2022 2331 by Jenifer De Anda RN  Outcome: Not Met This Shift  Goal: Control of chronic pain  Description: Control of chronic pain  Outcome: Met This Shift

## 2022-02-21 NOTE — PROCEDURES
[facility]  [facility].   Electrodiagnostic Laboratory  [facility]        Full Name: Geoff Morales Gender: Male  MRN: [id number] YOB: 1990  Location[de-identified] 46A      Visit Date: 2/21/2022 09:08  Age: 33 Years 10 Months Old  Examining Physician: Dr. Candelario Byrnes  Referring Physician: Dr. Archer School  Technician: Ashley Bowser   Height: 6 feet 0 inch  Weight: 195 lbs  BMI: 26.5  Notes: Left upper ext. pain and weakness      Impression  This was a normal study of the left upper extremity. There was no evidence of suggest a left C5-T1 motor radiculopathy, left brachial plexopathy, left upper extremity mononeuropathy, or any electrodiagnostic evidence of a myopathy. However, given the patient's timeline of symptoms there is a high chance of falsely negative findings. Should his symptoms persist longer than two weeks repeating this exam may provide some utility. History and Examination  Patient began having pain and weakness in his left arm began last Tuesday (2/15/22) night. He denies any recent trauma or exertion prior to his symptoms beginning. He does note exquisite pain on any movement of his neck. He describes a paresthesia like sensation around his left scapula extending into his left arm, primarily above the elbow along the medial aspect of the upper arm. Moving his arm also provokes neck pain. He has a history of Crohn's disease and repeats an unspecified \"muscle\" disorder in his mother and sister. On exam he has pain limited movements of the left upper extremity, but appears to have relatively full strength when encouraged throughout both upper extremities. He has reduced pinprick sensation in the left hand and forearm. Reflexes 2+ in the bilateral upper extremities. There was point tenderness to palpation over the T1 spinous process. Motor NCS      Nerve / Sites Lat. Amplitude Distance Lat Diff Velocity Temp. Amp. 1-2    ms mV cm ms m/s °C %   L Median - APB      Wrist 3.85 9.2 8   34.1 100      Elbow 7.55 8.8 20 3.70 54 34.1 95   L Ulnar - ADM      Wrist 2.86 11.6 8   34.3 100      B. Elbow 5.99 10.8 21 3.13 67 34.3 93.7      A. Elbow 7.29 10.5 10 1.30 77 34.1 90.7       Sensory NCS      Nerve / Sites Onset Lat Peak Lat PP Amp Distance Velocity Temp.    ms ms µV cm m/s °C   L Median - Digit II (Antidromic)      Mid Palm 1.30 1.77 61.8 7 54 33.9      Wrist 2.71 3.44 50.6 14 52 33.9   L Ulnar - Digit V (Antidromic)      Wrist 2.50 3.23 56.0 14 56 34   L Radial - Anatomical snuff box (Forearm)      Forearm 1.61 2.14 23.2 10 62 33.9       F  Wave      Nerve F Lat M Lat F-M Lat    ms ms ms   L Median - APB 27.9 3.9 24.0   L Ulnar - ADM 27.2 2.6 24.6       EMG         EMG Summary Table     Spontaneous MUAP Recruitment   Muscle IA Fib PSW Fasc H.F. Amp Dur. PPP Pattern   L. Deltoid N None None None None N N N N   L. Biceps brachii N None None None None N N N N   L. Triceps brachii N None None None None N N N N   L. Pronator teres N None None None None N N N N   L. First dorsal interosseous N None None None None N N N N   L.  Abductor pollicis brevis N None None None None N N N N             Electronically signed by John Paulino DO on 2/21/2022 at 1:48 PM

## 2022-02-21 NOTE — H&P
[facility]  Internal Medicine Residency Program  History and Physical    Patient:  Sarita Terrell 33 y.o. male   MRN: [id number]       Date of Service: 2/20/2022          Chief complaint: had concerns including Back Pain (Seen at [facility] ED yesterday where discovered T5 bone on bone. Today experiencing numbness of lt arm, neck pain, and back pain, having trouble lifting lt arm up. ). History of Present Illness   The patient is a 33 y.o. male , with past medical history of Crohn's disease, presented with left neck, shoulder, and left arm pain that started since last Tuesday. According to the patient's treatment, patient was playing poker on the last Tuesday then he started having left arm electric shocklike pain. Patient mentioned the pain was not that much severe and he ignored the pain. Next morning patient was having excruciating pain that started from back of his head and his shoulder. And slowly traveling to the left arm and he was not able to lift his shoulder up. Any movement in the left arm was increasing the pain. Patient mentioned he was always feeling this tingling and numbness sensation on his left arm and there a few times when he feels like his arm is giving up. 1 week ago patient was having a lump on her left wrist and was unable to move wrist joint but that has been resolved. Patient denied any sensory loss or motor loss during this time. With this complaint, patient was monitored closely during ED and day ordered CT scan. The CT scan which showed bulging herniated disc of C5-C6, patient was ordered dexamethasone and opiate pain medication and discharged home. But patient's pain did not improve. Today patient came to the ED with a complaint of same pain with severe intensity without any weakness.     On past medical history, patient had a history of Crohn's diseaseBut patient did not take any medication for Crohn's because he was not being able to afford the medication. Patient's last flare of Crohn's disease was almost 2 years ago. Patient mention he almost always have diarrhea but no constipation, no blood per rectum. Patient denies any nausea, vomiting, abdominal pain. Patient mention he is a current smoker but 1 pack last for 10 days. Patient also mention he drinks a pack of beer every Tuesday. Does not use any kind of illicit drug. ED Course: In ED, patient's HR was  98, heart rate was 76, respiratory rate was 16 and blood pressure was 127/76. Patient's CBC, CMP was unremarkable. CTA chest showed mild ectasia of the ascending aorta at 3 cm without any evidence of dissection or intimal flap. MRI of the thoracic spine showed small right-sided disc protrusion at T7/T8 level creating minimal mass-effect on the thecal sac and spinal cord. No nerve root compression was noticed. MRI cervical spine showed disc osteophyte complex on the left at C5-C6 level creating narrowing of the left neural foramina with no definitive nerve root compromise. Neurosurgery was consulted in the ED, per neurosurgery note no acute intervention at this point due to not contributing as neurosurgical emergency. ED Meds: Patient was given hydrocodone acetaminophen, Ativan, dexamethasone 10 mg.  ED Fluids: Patient was given no fluid        Past Medical History:      Diagnosis Date    Crohn disease ([facility], [place] [address]        Past Surgical History:    History reviewed. No pertinent surgical history. Medications Prior to Admission:    Prior to Admission medications    Medication Sig Start Date End Date Taking?  Authorizing Provider   cyclobenzaprine (FLEXERIL) 10 MG tablet Take 1 tablet by mouth 3 times daily as needed for Muscle spasms 2/18/22 2/28/22 Yes Tabitha Peralta PA-C   predniSONE (DELTASONE) 10 MG tablet Take 40mg po qd x 5 days  QS for 5 days 2/18/22 2/28/22 Yes Tabitha Peralta PA-C   ibuprofen (ADVIL;MOTRIN) 600 MG tablet Take 1 tablet by mouth 3 times daily as needed for Pain 2/18/22  Yes Tabitha Peralta PA-C   HYDROcodone-acetaminophen (NORCO) 5-325 MG per tablet Take 1 tablet by mouth every 6 hours as needed for Pain for up to 3 days. Intended supply: 3 days. Take lowest dose possible to manage pain 2/18/22 2/21/22 Yes Scotty Stuart PA-C       Allergies:  Patient has no known allergies. Social History:   TOBACCO:   reports that he has been smoking. He has been smoking about 1.00 pack per day. He has never used smokeless tobacco.  ETOH:   reports current alcohol use. Family History:   History reviewed. No pertinent family history. REVIEW OF SYSTEMS:    · Constitutional: No fever, no chills, no change in weight; good appetite  · HEENT: No blurred vision, no ear problems, no sore throat, no rhinorrhea. · Respiratory: No cough, no sputum production, no pleuritic chest pain, no shortness of breath  · Cardiology: No angina, no dyspnea on exertion, no paroxysmal nocturnal dyspnea, no orthopnea, no palpitation, no leg swelling. · Gastroenterology: No dysphagia, no reflux; no abdominal pain, no nausea or vomiting; no constipation or diarrhea. No hematochezia   · Genitourinary: No dysuria, no frequency, hesitancy; no hematuria  · Musculoskeletal: Pain in left shoulder, shoulder blade, and arm.   · Neurology: no focal weakness in extremities, no slurred speech, no double vision, no tingling or numbness sensation  · Endocrinology: no temperature intolerance, no polyphagia, polydipsia or polyuria  · Hematology: no increased bleeding, no bruising, no lymphadenopathy  · Skin: no skin changes noticed by patient  · Psychology: no depressed mood, no suicidal ideation      Physical Exam   · Vitals: /76   Pulse 63   Temp 97.3 °F (36.3 °C) (Temporal)   Resp 18   Ht 6' (1.829 m)   Wt 195 lb (88.5 kg)   SpO2 97%   BMI 26.45 kg/m²   · Net IO Since Admission: No IO data has been entered for this period [02/20/22 2037]      Physical Exam  Constitutional: Appearance: Normal appearance. He is normal weight. HENT:      Head: Normocephalic and atraumatic. Right Ear: Tympanic membrane normal.      Left Ear: Tympanic membrane normal.      Nose: Nose normal.      Mouth/Throat:      Mouth: Mucous membranes are moist.   Eyes:      General: No visual field deficit. Pupils: Pupils are equal, round, and reactive to light. Cardiovascular:      Rate and Rhythm: Normal rate and regular rhythm. Pulses: Normal pulses. Heart sounds: Normal heart sounds. No murmur heard. No friction rub. No gallop. Pulmonary:      Effort: Pulmonary effort is normal. No respiratory distress. Breath sounds: Normal breath sounds. No stridor. No wheezing, rhonchi or rales. Chest:      Chest wall: No tenderness. Abdominal:      General: Abdomen is flat. Bowel sounds are normal. There is no distension. Palpations: Abdomen is soft. Tenderness: There is no abdominal tenderness. There is no rebound. Musculoskeletal:      Right shoulder: No swelling, deformity, effusion, laceration, tenderness, bony tenderness or crepitus. Normal range of motion. Normal strength. Normal pulse. Left shoulder: Tenderness present. No swelling, deformity, effusion, laceration or crepitus. Decreased range of motion. Normal strength. Normal pulse. Arms:       Comments: During physical examination, when patient was told to 11 legs, patient mention intermittent left leg also initiating pain. No motor or sensory deficit was noticed in bilateral lower extremity. Skin:     General: Skin is warm. Neurological:      General: No focal deficit present. Mental Status: He is alert and oriented to person, place, and time. GCS: GCS eye subscore is 4. GCS verbal subscore is 5. GCS motor subscore is 6. Cranial Nerves: Cranial nerves are intact. No cranial nerve deficit, dysarthria or facial asymmetry. Sensory: Sensation is intact. No sensory deficit.       Motor: Motor function is intact. No weakness, tremor, atrophy, abnormal muscle tone or pronator drift. Coordination: Coordination is intact. Gait: Gait is intact. Deep Tendon Reflexes: Reflexes normal.      Reflex Scores:       Tricep reflexes are 2+ on the right side and 2+ on the left side. Bicep reflexes are 2+ on the right side and 2+ on the left side. Brachioradialis reflexes are 2+ on the right side and 2+ on the left side. Patellar reflexes are 2+ on the right side and 2+ on the left side. Achilles reflexes are 2+ on the right side and 2+ on the left side. Diet: ADULT DIET; Regular      Additional Respiratory  Assessments  Pulse: 63  Resp: 18  SpO2: 97 %         Labs and Imaging Studies   CBC:   Recent Labs     02/20/22  1045 02/20/22  1722   WBC 11.1 13.3*   HGB 13.8 14.9   HCT 40.4 44.5   MCV 90.8 90.8    274       BMP:    Recent Labs     02/20/22  1045 02/20/22  1722    138   K 3.8 4.2    103   CO2 24 21*   BUN 16 13   CREATININE 0.8 0.7   GLUCOSE 112* 128*       LIVER PROFILE:   Recent Labs     02/20/22  1045   AST 13   ALT 11   BILITOT 0.2   ALKPHOS 58     Imaging Studies:     CT CERVICAL SPINE WO CONTRAST    Result Date: 2/18/2022  EXAMINATION: CT OF THE CERVICAL SPINE WITHOUT CONTRAST 2/18/2022 6:47 pm TECHNIQUE: CT of the cervical spine was performed without the administration of intravenous contrast. Multiplanar reformatted images are provided for review. Dose modulation, iterative reconstruction, and/or weight based adjustment of the mA/kV was utilized to reduce the radiation dose to as low as reasonably achievable. COMPARISON: None. HISTORY: ORDERING SYSTEM PROVIDED HISTORY: Severe neck pain. Radiates to left UE TECHNOLOGIST PROVIDED HISTORY: Reason for exam:->Severe neck pain.   Radiates to left UE Decision Support Exception - unselect if not a suspected or confirmed emergency medical condition->Emergency Medical Condition (MA) FINDINGS: with lateralization to the left. There is mild narrowing of the left neural foramina. Some mass effect on the thecal sac with no evidence of definite nerve root contact or compromise. C6-C7: There is a central disc protrusion creating mass effect on the thecal sac and spinal cord with no evidence of signal abnormality within the cord. There is no significant narrowing of the neural foramina. There is contact of the bilateral nerve roots however no significant compromise. C7-T1: There is no significant disc protrusion, spinal canal stenosis or neural foraminal narrowing. There is a disc osteophyte complex on the left at the C5/C6 level creating some narrowing of the left neural foramina with no definite nerve root contact or compromise. Broad-based disc bulge with central protrusion at the C6/C7 level creating mass effect anteriorly on the thecal sac and spinal cord with no significant lateralization or narrowing of the neural foramina. No definite nerve root compromise present. MRI THORACIC SPINE WO CONTRAST    Result Date: 2/20/2022  EXAMINATION: MRI OF THE THORACIC SPINE WITHOUT CONTRAST,  2/20/2022 8:36 am TECHNIQUE: Multiplanar multisequence MRI of the thoracic spine was performed without the administration of intravenous contrast. COMPARISON: None HISTORY: ORDERING SYSTEM PROVIDED HISTORY:  Worsening radiculopathy, unable to move left arm. TECHNOLOGIST PROVIDED HISTORY: Reason for Exam: Worsening radiculopathy, unable to move left arm. What is the sedation requirement? None Decision Support Exception - unselect if not a suspected or confirmed emergency medical condition->Emergency Medical Condition (MA) What reading provider will be dictating this exam?  CRC FINDINGS: BONES/ALIGNMENT: There is normal alignment of the spine. The vertebral body heights are maintained. The bone marrow signal appears unremarkable.   There is a small right disc protrusion at the T7/T8 level creating some mass effect on the thecal sac and spinal cord with no evidence of cord abnormality. No significant nerve root contact or compromise. No significant narrowing of the neural foramina. SPINAL CORD: No abnormal cord signal is seen. SOFT TISSUES: No paraspinal mass identified. DEGENERATIVE CHANGES: No significant spinal canal stenosis or neural foraminal narrowing of the thoracic spine. Small right-sided disc protrusion at the T7/T8 level creating minimal mass effect on the thecal sac and spinal cord with no evidence of nerve root contact or spinal cord compromise. No abnormal signal.  The remainder of the thoracic spine is grossly unremarkable. XR CHEST PORTABLE    Result Date: 2/20/2022  EXAMINATION: ONE XRAY VIEW OF THE CHEST 2/20/2022 10:42 am COMPARISON: 02/13/2018 HISTORY: ORDERING SYSTEM PROVIDED HISTORY: back pain TECHNOLOGIST PROVIDED HISTORY: Reason for exam:->back pain What reading provider will be dictating this exam?->CRC FINDINGS: The lungs are without acute focal process. There is no effusion or pneumothorax. The cardiomediastinal silhouette is without acute process. The osseous structures are without acute process. No acute process. CTA CHEST W CONTRAST    Result Date: 2/20/2022  EXAMINATION: CTA OF THE CHEST, 2/20/2022 11:52 am TECHNIQUE: CTA of the chest was performed after the administration of intravenous contrast.  Multiplanar reformatted images are provided for review. MIP images are provided for review. Dose modulation, iterative reconstruction, and/or weight based adjustment of the mA/kV was utilized to reduce the radiation dose to as low as reasonably achievable.  COMPARISON: None HISTORY: ORDERING SYSTEM PROVIDED HISTORY:  Back pain, r/o dissection TECHNOLOGIST PROVIDED HISTORY: Reason for exam:  Back pain, r/o dissection Decision Support Exception - unselect if not a suspected or confirmed emergency medical condition->Emergency Medical Condition (MA) What reading provider will be dictating this exam?  CRC FINDINGS: Aorta: Mild ectasia identified of the ascending thoracic aorta at 3.0 cm. No evidence of thoracic aortic aneurysm or dissection. No acute abnormality of the aorta. Mediastinum: No evidence of mediastinal lymphadenopathy. The heart and pericardium demonstrate no acute abnormality. Lungs/Pleura: The lungs are without acute process. Minimal scarring identified at the lung bases bilaterally left greater than right. No focal consolidation or pulmonary edema. No evidence of pleural effusion or pneumothorax. Upper Abdomen: Limited images of the upper abdomen are unremarkable. Soft Tissues/Bones: No acute bone or soft tissue abnormality. Mediastinum: No evidence of mediastinal lymphadenopathy. The heart and pericardium demonstrate no acute abnormality. There is no acute abnormality of the thoracic aorta. Mild ectasia of the ascending thoracic aorta at 3.0 cm with no evidence of dissection or intimal flap. Minimal scarring at the left lung base with no acute intrathoracic abnormality. Resident's Assessment and Plan     Assessment and Plan:    The patient is a 33 y.o. male , with past medical history of Crohn's disease, presented with left neck, shoulder, and left arm pain that started since last Tuesday. He is currently being  managed for       Left arm pain 2/2 to cervical stenosis? Vs versus myositis  · MRI cervical spine, on 02/20/2022 showed, \"There is a disc osteophyte complex on the left at the C5/C6 level creating some narrowing of the left neural foramina with no definite nerve root contact or compromise. Broad-based disc bulge with central protrusion at the C6/C7 level creating mass effect anteriorly on the thecal sac and spinal cord with no significant lateralization or narrowing of the neural foramina. No definite nerve root compromise present. \"  · Neurosurgery on board no active intervention at this point  · Patient received acetaminophen and hydrocodone in ED  · Patient received dexamethasone in ED   · history of Crohn's disease,  Due to patient history of Crohn's disease there was concern for myositis. In ED CRP was 0.3, , ESR 17. Plan  · Pain management with ketorolac  · Muscle relaxant ordered  · Monitor patient's pain status  · Follow neurosurgery recommendation  · Consider pain management consult if necessary        History of Crohn's disease  · Not on any chronic medication  · Patient complaint diarrhea daily  Plan  · Was for Crohn's disease flare  · Consider myositis due to history of Crohn's disease and follow work-up. PT/OT evaluation: No  DVT prophylaxis/ GI prophylaxis: Lovenox/diet  Disposition: admit to house team 1      Roselia Knowles MD, PGY-1  Attending physician: Dr. Ru Flannery.

## 2022-02-21 NOTE — PROGRESS NOTES
[facility]  Internal Medicine Residency Program  Progress Note - House Team     Patient:  Bonnie Harris 33 y.o. male MRN: [id number]     Date of Service: 2/21/2022     CC: Left arm pain and numbness   Overnight events: None     Subjective     Patient was seen and examined this morning at bedside in no acute distress. Overnight no acute events. Patient was seen this morning and still complaining of pain in the left arm, said it felt like shocks going down the left arm. Patient unable to lift left arm, no other acute issues at this time. EMG was ordered and was normal. PMR is following and recommend starting gabapentin, will start. Continue to monitor in the hospital for response to pain management. Objective     Physical Exam:  · Vitals: /69   Pulse 65   Temp 97.2 °F (36.2 °C) (Temporal)   Resp 16   Ht 6' (1.829 m)   Wt 195 lb (88.5 kg)   SpO2 96%   BMI 26.45 kg/m²     · I & O - 24hr: No intake/output data recorded. Physical Exam  Constitutional:       Appearance: Normal appearance. HENT:      Head: Normocephalic. Eyes:      Pupils: Pupils are equal, round, and reactive to light. Cardiovascular:      Rate and Rhythm: Regular rhythm. Pulses: Normal pulses. Heart sounds: Normal heart sounds. Pulmonary:      Effort: Pulmonary effort is normal.      Breath sounds: Normal breath sounds. Abdominal:      General: Abdomen is flat. Bowel sounds are normal. There is no distension. Palpations: Abdomen is soft. Tenderness: There is no abdominal tenderness. Musculoskeletal:      Cervical back: Normal range of motion. Skin:     General: Skin is warm and dry. Capillary Refill: Capillary refill takes less than 2 seconds. Neurological:      General: No focal deficit present. Mental Status: He is alert and oriented to person, place, and time. Comments: Patient had normal sensation bilaterally in the upper extremities.  Strength was a 5/5 on the right and a 4/5 on the left. Subject  Pertinent Labs & Imaging Studies   chuy  CBC:   Lab Results   Component Value Date    WBC 13.3 02/20/2022    RBC 4.90 02/20/2022    HGB 14.9 02/20/2022    HCT 44.5 02/20/2022    MCV 90.8 02/20/2022    MCH 30.4 02/20/2022    MCHC 33.5 02/20/2022    RDW 11.9 02/20/2022     02/20/2022    MPV 11.8 02/20/2022     CMP:    Lab Results   Component Value Date     02/20/2022    K 4.2 02/20/2022     02/20/2022    CO2 21 02/20/2022    BUN 13 02/20/2022    CREATININE 0.7 02/20/2022    GFRAA >60 02/20/2022    LABGLOM >60 02/20/2022    GLUCOSE 128 02/20/2022    PROT 7.8 02/20/2022    LABALBU 4.9 02/20/2022    CALCIUM 9.5 02/20/2022    BILITOT 0.2 02/20/2022    ALKPHOS 58 02/20/2022    AST 13 02/20/2022    ALT 11 02/20/2022     BMP:    Lab Results   Component Value Date     02/20/2022    K 4.2 02/20/2022     02/20/2022    CO2 21 02/20/2022    BUN 13 02/20/2022    LABALBU 4.9 02/20/2022    CREATININE 0.7 02/20/2022    CALCIUM 9.5 02/20/2022    GFRAA >60 02/20/2022    LABGLOM >60 02/20/2022    GLUCOSE 128 02/20/2022     Sodium:    Lab Results   Component Value Date     02/20/2022     Potassium:    Lab Results   Component Value Date    K 4.2 02/20/2022       CTA CHEST W CONTRAST   Final Result   Mild ectasia of the ascending thoracic aorta at 3.0 cm with no evidence of   dissection or intimal flap. Minimal scarring at the left lung base with no   acute intrathoracic abnormality. XR CHEST PORTABLE   Final Result   No acute process. MRI THORACIC SPINE WO CONTRAST   Final Result   Small right-sided disc protrusion at the T7/T8 level creating minimal mass   effect on the thecal sac and spinal cord with no evidence of nerve root   contact or spinal cord compromise. No abnormal signal.  The remainder of the   thoracic spine is grossly unremarkable.          MRI CERVICAL SPINE WO CONTRAST   Final Result   There is a disc osteophyte communicated back to the patient, family if applicable and contributing physicians. I agree with the assessment, plan and orders as noted by the resident.       Armando Talley DO , Sarah Sloan  Professor of Internal Medicine  Pulmonary, Critical Care & Sleep Medicine  Internal Medicine Academic Faculty

## 2022-02-21 NOTE — PROGRESS NOTES
[address] [place] [address] Ave  [address] [place] [address]      Date:2022                 Patient Name: Mega Portillo  MRN: [id number]  : 1990  Room: [address] [place] [address]    Referring Provider: Jose Caldwell MD  Specific Provider Orders/Date: OT evaluation and treat 22    Evaluating OT: Chaparro Shaikh OTR/L #[id number]    Diagnosis: Back pain [M54.9]  Cervical radiculopathy [M54.12]  Upper extremity weakness [R29.898]      Surgery: History reviewed. No pertinent surgical history. Pertinent Medical History:  has a past medical history of Crohn disease ([facility] [address]      Precautions:  Fall Risk, spinal precautions    Assessment of current deficits    [x] Functional mobility  [x]ADLs  [] Strength               []Cognition   [x] Functional transfers   [x] IADLs         [x] Safety Awareness   [x]Endurance   [] Fine Coordination              [] Balance      [] Vision/perception   [x]Sensation    []Gross Motor Coordination  [x] ROM  [] Delirium                   [] Motor Control     OT PLAN OF CARE   OT POC based on physician orders, patient diagnosis and results of clinical assessment    Frequency/Duration   1-2 days/wk for 1 week PRN   Specific OT Treatment Interventions to include:   * Instruction/training on adapted ADL techniques and AE recommendations to increase functional independence within precautions       * Training on energy conservation strategies, correct breathing pattern and techniques to improve independence/tolerance for self-care routine  * Functional transfer/mobility training/DME recommendations for increased independence, safety, and fall prevention  * Patient/Family education to increase follow through with safety techniques and functional independence  * Recommendation of environmental modifications for increased safety with functional transfers/mobility and ADLs  * Sensory re-education to improve body/limb awareness, maintain/improve skin integrity, and improve hand/UE motor function  * Therapeutic exercise to improve motor endurance, ROM, and functional strength for ADLs/functional transfers    Recommended Adaptive Equipment: TBD    Home Living: Pt lives with GF in a 2 story home with one steps and no hand rails. Bed and bath on second  floor with full flight of  steps and one HR's.   Bathroom setup: walk in shower    Equipment owned: none    Prior Level of Function: Independent with ADLs , Independent with IADLs; ambulated no AD   Driving: yes   Occupation: unemployed - previously a   Medication management: self  Leisure: enjoys playing pool and [employer]    Pain Level: 7-8/10 LUE and neck and back pain    Cognition: A&O: 4/4; Follows multi step directions with increase repetition   Memory:  Fair + recall of spinal precautions   Sequencing:  good   Problem solving:  Good-   Judgement/safety:  Good-     Functional Assessment:  AM-PAC Daily Activity Raw Score: 19/24   Initial Eval Status  Date: 2/21/22 Treatment Status  Date: STGs = LTGs  Time frame: 1-3 days   Feeding Independent       Grooming SBA  Mod I    UB Dressing Min A   Mod I    LB Dressing Min A education on one handed tech due to LUE pain  Mod I    Bathing Simulated SBA educated on LE bathing with in spinal precautions  Mod I    Toileting SBA  Mod I    Bed Mobility  Supine to sit: min A  Sit to supine:  n/t  Supine to sit: mod I log rolling  Sit to supine: mod I log rolling   Functional Transfers Independent       Functional Mobility Independent     Balance Sitting:     Static:  Good     Dynamic:SBA  Standing: Independent                                                                       Activity Tolerance Fair limited by pain and poor ability to use LUE  Good    Visual/  Perceptual Glasses: contacts WFL         Safety Good-                                  Good follow through of spinal precautions in  ADL completion     Hand Dominance R UE ROM exercises to improve strength/function for increased [place] with ADLs & iADLs         Rehab Potential: Good  for established goals     Patient / Family Goal: decrease pain and increase functional movement      Patient and/or family were instructed on functional diagnosis, prognosis/goals and OT plan of care. Demonstrated good understanding. Eval Complexity: low    Time In: 13:15  Time Out: 13:40  Total Treatment Time: 10 min. Min Units   OT Eval Low 01529 X     OT Eval Medium 07349      OT Eval High [id number]       OT Re-Eval [id number]       Therapeutic Ex [id number]       Therapeutic Activities 42196  10  1   ADL/Self Care [id number]       Orthotic Management 03787       Neuro Re-Ed 43814       Non-Billable Time          Evaluation Time includes thorough review of current medical information, gathering information on past medical history/social history and prior level of function, completion of standardized testing/informal observation of tasks, assessment of data and education on plan of care and goals. Heidi Schmidt.  [address], [address]

## 2022-02-21 NOTE — CONSULTS
[facility]) Pain Management  IP CONSULT NOTE       Patient: Lacie Martinez  : 1990  Date of Admission: 2022  4:16 AM  Date of Service: 2022  Reason for Consult:  Neck pain    Chief Complaint   Patient presents with    Back Pain     Seen at [facility] ED yesterday where discovered T5 bone on bone. Today experiencing numbness of lt arm, neck pain, and back pain, having trouble lifting lt arm up. HISTORY OF PRESENT ILLNESS:    Samuel Lara is a 33year old male who presented to the ER on 22 for acute neck pain. The patient was previously seen on 21 at [facility] ER in which he was prescribed steroids, muscle relaxers and NSAIDs with no relief. The patient also had a CT scan which showed bulging herniated disc of C5-C6   The patient has a past medical history of Crohn's disease and smokes 1 pack/day and occasionally uses marijuana. The patient was evaluated today for acute neck pain. The pain started after playing poker last Tuesday and progressively worsened. The pain is bilateral lower neck and radiates down left arm to hand accompanied by numbness and tingling to pinky and lateral bicep. The pain is constant and rates his pain 10/10 with movement and 6-7/10 at rest. The patient denies edna weakness amd he has had no history of trauma. No significant medical or surgical history noted below. Past Medical History:       Diagnosis Date    Crohn disease [facility])         Past Surgical History: History reviewed. No pertinent surgical history.     ALLERGIES:No Known Allergies  LABS:   Recent Results (from the past 72 hour(s))   Sedimentation Rate    Collection Time: 22 10:45 AM   Result Value Ref Range    Sed Rate 17 (H) 0 - 15 mm/Hr   C-Reactive Protein    Collection Time: 22 10:45 AM   Result Value Ref Range    CRP 0.3 0.0 - 0.4 mg/dL   CK    Collection Time: 22 10:45 AM   Result Value Ref Range    Total  (H) 20 - 200 U/L   CBC with Auto Differential Collection Time: 02/20/22 10:45 AM   Result Value Ref Range    WBC 11.1 4.5 - 11.5 E9/L    RBC 4.45 3.80 - 5.80 E12/L    Hemoglobin 13.8 12.5 - 16.5 g/dL    Hematocrit 40.4 37.0 - 54.0 %    MCV 90.8 80.0 - 99.9 fL    MCH 31.0 26.0 - 35.0 pg    MCHC 34.2 32.0 - 34.5 %    RDW 11.9 11.5 - 15.0 fL    Platelets 800 460 - 245 E9/L    MPV 11.5 7.0 - 12.0 fL    Neutrophils % 73.8 43.0 - 80.0 %    Immature Granulocytes % 0.3 0.0 - 5.0 %    Lymphocytes % 18.5 (L) 20.0 - 42.0 %    Monocytes % 6.7 2.0 - 12.0 %    Eosinophils % 0.4 0.0 - 6.0 %    Basophils % 0.3 0.0 - 2.0 %    Neutrophils Absolute 8.21 (H) 1.80 - 7.30 E9/L    Immature Granulocytes # 0.03 E9/L    Lymphocytes Absolute 2.06 1.50 - 4.00 E9/L    Monocytes Absolute 0.74 0.10 - 0.95 E9/L    Eosinophils Absolute 0.05 0.05 - 0.50 E9/L    Basophils Absolute 0.03 0.00 - 0.20 E9/L   Comprehensive Metabolic Panel    Collection Time: 02/20/22 10:45 AM   Result Value Ref Range    Sodium 140 132 - 146 mmol/L    Potassium 3.8 3.5 - 5.0 mmol/L    Chloride 105 98 - 107 mmol/L    CO2 24 22 - 29 mmol/L    Anion Gap 11 7 - 16 mmol/L    Glucose 112 (H) 74 - 99 mg/dL    BUN 16 6 - 20 mg/dL    CREATININE 0.8 0.7 - 1.2 mg/dL    GFR Non-African American >60 >=60 mL/min/1.73    GFR African American >60     Calcium 9.6 8.6 - 10.2 mg/dL    Total Protein 7.8 6.4 - 8.3 g/dL    Albumin 4.9 3.5 - 5.2 g/dL    Total Bilirubin 0.2 0.0 - 1.2 mg/dL    Alkaline Phosphatase 58 40 - 129 U/L    ALT 11 0 - 40 U/L    AST 13 0 - 39 U/L   CBC with Auto Differential    Collection Time: 02/20/22  5:22 PM   Result Value Ref Range    WBC 13.3 (H) 4.5 - 11.5 E9/L    RBC 4.90 3.80 - 5.80 E12/L    Hemoglobin 14.9 12.5 - 16.5 g/dL    Hematocrit 44.5 37.0 - 54.0 %    MCV 90.8 80.0 - 99.9 fL    MCH 30.4 26.0 - 35.0 pg    MCHC 33.5 32.0 - 34.5 %    RDW 11.9 11.5 - 15.0 fL    Platelets 970 048 - 933 E9/L    MPV 11.8 7.0 - 12.0 fL    Neutrophils % 89.5 (H) 43.0 - 80.0 %    Immature Granulocytes % 0.6 0.0 - 5.0 % Lymphocytes % 8.2 (L) 20.0 - 42.0 %    Monocytes % 1.4 (L) 2.0 - 12.0 %    Eosinophils % 0.1 0.0 - 6.0 %    Basophils % 0.2 0.0 - 2.0 %    Neutrophils Absolute 11.89 (H) 1.80 - 7.30 E9/L    Immature Granulocytes # 0.08 E9/L    Lymphocytes Absolute 1.09 (L) 1.50 - 4.00 E9/L    Monocytes Absolute 0.19 0.10 - 0.95 E9/L    Eosinophils Absolute 0.01 (L) 0.05 - 0.50 E9/L    Basophils Absolute 0.02 0.00 - 0.20 F0/F   Basic Metabolic Panel    Collection Time: 02/20/22  5:22 PM   Result Value Ref Range    Sodium 138 132 - 146 mmol/L    Potassium 4.2 3.5 - 5.0 mmol/L    Chloride 103 98 - 107 mmol/L    CO2 21 (L) 22 - 29 mmol/L    Anion Gap 14 7 - 16 mmol/L    Glucose 128 (H) 74 - 99 mg/dL    BUN 13 6 - 20 mg/dL    CREATININE 0.7 0.7 - 1.2 mg/dL    GFR Non-African American >60 >=60 mL/min/1.73    GFR African American >60     Calcium 9.5 8.6 - 10.2 mg/dL   CK    Collection Time: 02/21/22  8:15 AM   Result Value Ref Range    Total  (H) 20 - 200 U/L       IMAGING: CT CERVICAL SPINE WO CONTRAST    Result Date: 2/18/2022  1. No acute fracture or subluxation. 2. Left neural foraminal narrowing at C5-6 secondary to uncovertebral hypertrophy. MRI CERVICAL SPINE WO CONTRAST    Result Date: 2/20/2022  There is a disc osteophyte complex on the left at the C5/C6 level creating some narrowing of the left neural foramina with no definite nerve root contact or compromise. Broad-based disc bulge with central protrusion at the C6/C7 level creating mass effect anteriorly on the thecal sac and spinal cord with no significant lateralization or narrowing of the neural foramina. No definite nerve root compromise present. MRI THORACIC SPINE WO CONTRAST    Result Date: 2/20/2022  Small right-sided disc protrusion at the T7/T8 level creating minimal mass effect on the thecal sac and spinal cord with no evidence of nerve root contact or spinal cord compromise.   No abnormal signal.  The remainder of the thoracic spine is grossly unremarkable. XR CHEST PORTABLE    Result Date: 2/20/2022  No acute process. CTA CHEST W CONTRAST    Result Date: 2/20/2022  Mild ectasia of the ascending thoracic aorta at 3.0 cm with no evidence of dissection or intimal flap. Minimal scarring at the left lung base with no acute intrathoracic abnormality. REVIEW OF SYSTEMS:   Denies any chest pain, headache, dyspnea, recent weight loss, fevers, chills or night sweats. PHYSICAL EXAMINATION:   General appearance: in moderate distress with lying in bed, alert and oriented x3 pleasant and cooperative   Build: normal   Function: unable to assess at this time. HEENT:   Head: normocephalic and atraumatic   Pupils: regular, round and equal.   Sclera: icterus present,   Lungs:   No respiratory distress. Symmetrical expansion. Abdomen:   soft nontender nondistended positive bowel sounds.    Cervical Spine:  Spine inspection: normal  CVA tenderness: No   Palpation: tenderness with palpation low cervical spine L>R and left scapular region  Range of motion: Limited ROM   Unable to lift left arm beyond 30 degrees  Extremities:   Tremors: no   Intact:Yes   Varicose veins: absent   Pulses:popliteal pulses 2+ bilaterally   Cyanosis:none   Edema: left scapular region  Neurological:  Abnormal sensation to light touch pinky of left hand and left lateral bicep  Dermatology:   Skin:no unusual rashes, no skin lesions, no palpable subcutaneous nodules and good skin turgor      Inpatient Medication regimen:   Current Facility-Administered Medications   Medication Dose Route Frequency Provider Last Rate Last Admin    dexamethasone (DECADRON) injection 10 mg  10 mg IntraVENous Q24H Evelina Aragon MD   10 mg at 02/21/22 1338    ibuprofen (ADVIL;MOTRIN) tablet 400 mg  400 mg Oral TID WC Evelina Aragon MD   400 mg at 02/21/22 1338    [START ON 2/22/2022] pantoprazole (PROTONIX) tablet 40 mg  40 mg Oral QAM AC Evelina Aragon MD        cyclobenzaprine (FLEXERIL) tablet 10 mg  10 mg Oral TID PRN Rufina Palacios MD   10 mg at 02/21/22 0841    sodium chloride flush 0.9 % injection 5-40 mL  5-40 mL IntraVENous 2 times per day Lakia Bartlett MD   10 mL at 02/21/22 0836    sodium chloride flush 0.9 % injection 5-40 mL  5-40 mL IntraVENous PRN Cortney Hernandez MD        0.9 % sodium chloride infusion  25 mL IntraVENous PRN Cortney Hernandez MD        enoxaparin (LOVENOX) injection 40 mg  40 mg SubCUTAneous Daily Cortney Hernandez MD   40 mg at 02/21/22 0836    ondansetron (ZOFRAN-ODT) disintegrating tablet 4 mg  4 mg Oral Q8H PRN Cortney Hernandez MD        Or    ondansetron (ZOFRAN) injection 4 mg  4 mg IntraVENous Q6H PRN Cortney Hernandez MD        polyethylene glycol (GLYCOLAX) packet 17 g  17 g Oral Daily PRN Cortney Hernandez MD        acetaminophen (TYLENOL) tablet 650 mg  650 mg Oral Q6H PRN Cortney Hernandez MD   650 mg at 02/21/22 0841    Or    acetaminophen (TYLENOL) suppository 650 mg  650 mg Rectal Q6H PRN Cortney Hernandez MD        nicotine (NICODERM CQ) 14 MG/24HR 1 patch  1 patch TransDERmal Daily Lakia Bartlett MD           Impression:  1. Acute neck pain  2. Cervical radiculopathy  3. Recent MRI of Cervical spine shows central protrusion at the C6/C7 level with some narrowing of the left neural foramina at C5/C6. 4. EMG study done 2/21/2022 that was normal of the left upper extremity. Recommendation for plan of care:   1. Continue with Flexeril 10mg tid prn   2. Continue with Decadron 10mg IV every 24 hours  3. Continue with IBU 400mg tid with meals  4. Start Gabapentin 300mg po qhs x 3 days then can increase to bid dosing thereafter if tolerated. 5. Consider referral to [facility] for further work up upon discharge. 5. Pain management will continue to follow.      Case discussed with Dr. Estela Calvillo       Thank you for the referral.   Sherrill Swanson, APRN - [id number]        Agree with above  Participated in treatment plan.     Eddy HARE

## 2022-02-21 NOTE — PROGRESS NOTES
[facility]  Internal Medicine Residency Program  Progress Note - House Team     Patient:  Ricki Elizondo 33 y.o. male MRN: [id number]     Date of Service: 2/21/2022     CC: Left UE/Shoulder/Neck pain     Subjective   Brief Summary:  The patient is a 33 y.o. male , with past medical history of Crohn's disease, presented with left neck, shoulder, and left arm pain that began last Tuesday. He presents after being seen in the [facility] ED. Hospital Day: 2    Overnight Events:   Admitted to [place] [id number]. 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 but cannot remember any direct trauma to the areas   Patient does have some concern about the addiction potential of any pain medication and is weary of using them    Patient does endorses some pertinent family history   o Mother: degenerative disc disease and another muscular disorder   o Sister: unspecified muscular disease   o He will check with family for more information about the muscular disorder his mother and sister have     Objective     Physical Exam:  · Vitals: /77   Pulse 60   Temp 96.9 °F (36.1 °C) (Temporal)   Resp 16   Ht 6' (1.829 m)   Wt 195 lb (88.5 kg)   SpO2 97%   BMI 26.45 kg/m²     · I & O - 24hr: No intake/output data recorded.    · General Appearance: alert, appears stated age, cooperative and moderate distress  · HEENT:  Head: Normal, normocephalic, atraumatic. · Eye: Normal external eye, conjunctiva, lids cornea, COSTA. · Ears: hearing equal bilaterally   · Nose: Normal external nose, mucus membranes and septum. · Pharynx: Dental Hygiene adequate. Normal buccal mucosa. Normal pharynx. · Neck / Thyroid: Supple, no masses, nodes, nodules or enlargement. · Neck: no JVD, supple, symmetrical, trachea midline and thyroid not enlarged, symmetric, no tenderness/mass/nodules  · Lung: clear to auscultation bilaterally  · Heart: regular rate and rhythm, S1, S2 normal, no murmur, click, rub or gallop  · Abdomen: soft, non-tender; bowel sounds normal; no masses,  no organomegaly  · Extremities:  autraumaic, without edema, LUE 4/5 strength, RUE 5/5 strength, LLE/RLE 5/5 strength, sensation intact BL UE and LE, discriminitive touch intact, no gross joint deformity or skin changes noted   · Musculokeletal: No joint swelling, no muscle tenderness. ROM normal in all joints of extremities.    · Neurologic: Mental status: Alert, oriented, thought content appropriate, affect:  generally upset and mildly agitated - congruent with mood   Subject  Pertinent Labs & Imaging Studies   chuy  CBC:   Lab Results   Component Value Date    WBC 13.3 02/20/2022    RBC 4.90 02/20/2022    HGB 14.9 02/20/2022    HCT 44.5 02/20/2022    MCV 90.8 02/20/2022    MCH 30.4 02/20/2022    MCHC 33.5 02/20/2022    RDW 11.9 02/20/2022     02/20/2022    MPV 11.8 02/20/2022     CBC with Differential:    Lab Results   Component Value Date    WBC 13.3 02/20/2022    RBC 4.90 02/20/2022    HGB 14.9 02/20/2022    HCT 44.5 02/20/2022     02/20/2022    MCV 90.8 02/20/2022    MCH 30.4 02/20/2022    MCHC 33.5 02/20/2022    RDW 11.9 02/20/2022    LYMPHOPCT 8.2 02/20/2022    MONOPCT 1.4 02/20/2022    BASOPCT 0.2 02/20/2022    MONOSABS 0.19 02/20/2022    LYMPHSABS 1.09 02/20/2022    EOSABS 0.01 02/20/2022    BASOSABS 0.02 02/20/2022     CMP:    Lab Results   Component Value Date     02/20/2022    K 4.2 02/20/2022     02/20/2022    CO2 21 02/20/2022    BUN 13 02/20/2022    CREATININE 0.7 02/20/2022    GFRAA >60 02/20/2022    LABGLOM >60 02/20/2022    GLUCOSE 128 02/20/2022    PROT 7.8 02/20/2022    LABALBU 4.9 02/20/2022    CALCIUM 9.5 02/20/2022    BILITOT 0.2 02/20/2022    ALKPHOS 58 02/20/2022    AST 13 02/20/2022    ALT 11 02/20/2022     BMP:    Lab Results   Component Value Date     02/20/2022    K 4.2 02/20/2022     02/20/2022    CO2 21 02/20/2022    BUN 13 02/20/2022    LABALBU 4.9 02/20/2022    CREATININE 0.7 02/20/2022    CALCIUM 9.5 02/20/2022    GFRAA >60 02/20/2022    LABGLOM >60 02/20/2022    GLUCOSE 128 02/20/2022     Ionized Calcium:  No results found for: IONCA    Magnesium:    Lab Results   Component Value Date    MG 1.6 02/13/2018     Phosphorus:  No results found for: PHOS  LDH:  No results found for: LDH  Uric Acid:  No results found for: Tiffany Darter     U/A:   Lab Results   Component Value Date    COLORU Yellow 02/21/2022    PROTEINU Negative 02/21/2022    PHUR 5.5 02/21/2022    CLARITYU Clear 02/21/2022    SPECGRAV 1.025 02/21/2022    LEUKOCYTESUR Negative 02/21/2022    UROBILINOGEN 0.2 02/21/2022    BILIRUBINUR Negative 02/21/2022    BLOODU Negative 02/21/2022    GLUCOSEU Negative 02/21/2022     HgBA1c:  No results found for: LABA1C  TSH:  No results found for: TSH  VITAMIN B12: No components found for: B12  FOLATE:  No results found for: FOLATE  RF:  No results found for: RF  DSDNA:  No components found for: DNA  Urine Toxicology:  No components found for: IAMMENTA, IBARBIT, IBENZO, ICOCAINE, IMARTHC, IOPIATES, IPHENCYC  24 Hour Urine for Protein:  No components found for: Fidencio Mai, JPSS11TG, UTV3    CTA CHEST W CONTRAST   Final Result   Mild ectasia of the ascending thoracic aorta at 3.0 cm with no evidence of   dissection or intimal flap. Minimal scarring at the left lung base with no   acute intrathoracic abnormality.          XR CHEST PORTABLE   Final Result   No acute process. MRI THORACIC SPINE WO CONTRAST   Final Result   Small right-sided disc protrusion at the T7/T8 level creating minimal mass   effect on the thecal sac and spinal cord with no evidence of nerve root   contact or spinal cord compromise. No abnormal signal.  The remainder of the   thoracic spine is grossly unremarkable. MRI CERVICAL SPINE WO CONTRAST   Final Result   There is a disc osteophyte complex on the left at the C5/C6 level creating   some narrowing of the left neural foramina with no definite nerve root   contact or compromise. Broad-based disc bulge with central protrusion at the C6/C7 level creating   mass effect anteriorly on the thecal sac and spinal cord with no significant   lateralization or narrowing of the neural foramina. No definite nerve root   compromise present. EMG Results   Impression: \"This was a normal study of the left upper extremity. There was no evidence of suggest a left C5-T1 motor radiculopathy, left brachial plexopathy, left upper extremity mononeuropathy, or any electrodiagnostic evidence of a myopathy. However, given the patient's timeline of symptoms there is a high chance of falsely negative findings. Should his symptoms persist longer than two weeks repeating this exam may provide some utility. \"       Resident's Assessment and Plan     Assessment and Plan:    1. Left Arm/Shoulder Pain - atraumatic   a. Neurosurgery Consulted - follow recommendations  i. No acute intervention planned   ii. Consider work up for myositis due to pre-existing Crohn's   b. Pain Management   i. Continue Tylenol 650 mg Q6 PRN    ii. Continue Ibuprofen 400 mg Q8 with meals   iii. Gabapentin 300 mg PO QHS x3 days - then BID if symptoms persist  iv. Continue cyclobenzaprine (flexeril) 10 mg TID prn   v. Pain management consulted - patient states he takes Vicodin at home   1. Follow recommendations above    2.  Will follow patient outpatient as well   c. Continue CMP/CBC QoD   d. Warm compress as needed   e. Dexamethasone 10 mg IV QD   f. Pending Labs   i. Aldolase   ii. TSH   iii. Vitamin D 25 hydroxy   iv. UA resulted above   v. Urine Micro albumin   g. Last  (in ED) - current CK pending   h. Last ESR 0.3   i. Last CRP 17  j. OMT as tolerated - soft tissue, indirect techniques, myofascial release    k. PT Following   i. Recommendations are that patient has no acute PT needed   ii. Should follow up outpatient for rehab to neck and LUE   iii. AM-PAC 22/24  l. Myositis Work Up   i. Consider antibody Studies - Anti-Kell/SNP/Mi-2/PL-7/PL-12  ii. EMG Resulted above - normal UE with high rate of false negative - repeat exam in 2 weeks if symptoms persist   iii. Consider MRI and/or Muscle Biopsy     2. History of Crohn's Disease   a. Not being treated outpatient and not current complaints   i. Last Flare: 4 years ago   ii. Has been untreated due to lack of medical insurance for 2-3 years   b. Monitor for signs of flare   c. Patient was given Dexamethasone 10 mg in ED - continue     3. History of Tobacco Use   a. 1 ppd smoker   b. Nicotine patch PRN       Diet: Regular diet   PT/OT evaluation: consulted and following - AM-PAC score 22/24   DVT prophylaxis/ GI prophylaxis: Lovonox + Protonix   Disposition: continue inpatient care     Xavier Gutierrez, MS-3   Attending physician: Dr. Josefina Carrion  Department of Internal Medicine  Internal Medicine Residency / [address] [place] [address] case was discussed, including pertinent history and examination findings with the multidisciplinary team during bedside rounds. I have seen and examined the patient and the key elements of the encounter have been performed by myself. I have read and reviewed the documentation.  If needed or disputed the following findings, counseling and treatment options which have been corrected and communicated back to the patient, family if applicable and contributing physicians. I agree with the assessment, plan and orders as noted by the resident.       Joaquina Romero DO , Brenda Vergara  Professor of Internal Medicine  Pulmonary, Critical Care & Sleep Medicine  Internal Medicine Academic Faculty

## 2022-02-22 PROCEDURE — 2580000003 HC RX 258: Performed by: STUDENT IN AN ORGANIZED HEALTH CARE EDUCATION/TRAINING PROGRAM

## 2022-02-22 PROCEDURE — 1200000000 HC SEMI PRIVATE

## 2022-02-22 PROCEDURE — 6370000000 HC RX 637 (ALT 250 FOR IP): Performed by: INTERNAL MEDICINE

## 2022-02-22 PROCEDURE — 6360000002 HC RX W HCPCS: Performed by: STUDENT IN AN ORGANIZED HEALTH CARE EDUCATION/TRAINING PROGRAM

## 2022-02-22 PROCEDURE — 6370000000 HC RX 637 (ALT 250 FOR IP)

## 2022-02-22 PROCEDURE — 6370000000 HC RX 637 (ALT 250 FOR IP): Performed by: STUDENT IN AN ORGANIZED HEALTH CARE EDUCATION/TRAINING PROGRAM

## 2022-02-22 PROCEDURE — 6360000002 HC RX W HCPCS: Performed by: INTERNAL MEDICINE

## 2022-02-22 PROCEDURE — 99233 SBSQ HOSP IP/OBS HIGH 50: CPT | Performed by: INTERNAL MEDICINE

## 2022-02-22 RX ORDER — LORAZEPAM 0.5 MG/1
0.5 TABLET ORAL EVERY 6 HOURS PRN
Status: DISCONTINUED | OUTPATIENT
Start: 2022-02-22 | End: 2022-02-23 | Stop reason: HOSPADM

## 2022-02-22 RX ORDER — VITAMIN B COMPLEX
1000 TABLET ORAL DAILY
Status: DISCONTINUED | OUTPATIENT
Start: 2022-02-22 | End: 2022-02-23 | Stop reason: HOSPADM

## 2022-02-22 RX ORDER — HYDROXYZINE HYDROCHLORIDE 50 MG/ML
50 INJECTION, SOLUTION INTRAMUSCULAR ONCE
Status: DISCONTINUED | OUTPATIENT
Start: 2022-02-22 | End: 2022-02-23 | Stop reason: HOSPADM

## 2022-02-22 RX ORDER — DEXAMETHASONE 4 MG/1
10 TABLET ORAL DAILY
Status: DISCONTINUED | OUTPATIENT
Start: 2022-02-22 | End: 2022-02-23 | Stop reason: HOSPADM

## 2022-02-22 RX ADMIN — GABAPENTIN 300 MG: 300 CAPSULE ORAL at 09:55

## 2022-02-22 RX ADMIN — IBUPROFEN 400 MG: 400 TABLET, FILM COATED ORAL at 12:13

## 2022-02-22 RX ADMIN — SODIUM CHLORIDE, PRESERVATIVE FREE 10 ML: 5 INJECTION INTRAVENOUS at 10:01

## 2022-02-22 RX ADMIN — IBUPROFEN 400 MG: 400 TABLET, FILM COATED ORAL at 18:03

## 2022-02-22 RX ADMIN — CYCLOBENZAPRINE 10 MG: 10 TABLET, FILM COATED ORAL at 10:03

## 2022-02-22 RX ADMIN — ACETAMINOPHEN 650 MG: 325 TABLET ORAL at 10:02

## 2022-02-22 RX ADMIN — SODIUM CHLORIDE, PRESERVATIVE FREE 10 ML: 5 INJECTION INTRAVENOUS at 20:40

## 2022-02-22 RX ADMIN — IBUPROFEN 400 MG: 400 TABLET, FILM COATED ORAL at 10:01

## 2022-02-22 RX ADMIN — ACETAMINOPHEN 650 MG: 325 TABLET ORAL at 18:04

## 2022-02-22 RX ADMIN — DEXAMETHASONE 10 MG: 4 TABLET ORAL at 12:13

## 2022-02-22 RX ADMIN — Medication 1000 UNITS: at 10:03

## 2022-02-22 RX ADMIN — ENOXAPARIN SODIUM 40 MG: 100 INJECTION SUBCUTANEOUS at 10:04

## 2022-02-22 RX ADMIN — CYCLOBENZAPRINE 10 MG: 10 TABLET, FILM COATED ORAL at 18:03

## 2022-02-22 RX ADMIN — LORAZEPAM 0.5 MG: 0.5 TABLET ORAL at 12:13

## 2022-02-22 ASSESSMENT — PAIN DESCRIPTION - PROGRESSION
CLINICAL_PROGRESSION: GRADUALLY IMPROVING
CLINICAL_PROGRESSION: GRADUALLY WORSENING

## 2022-02-22 ASSESSMENT — PAIN - FUNCTIONAL ASSESSMENT
PAIN_FUNCTIONAL_ASSESSMENT: PREVENTS OR INTERFERES WITH MANY ACTIVE NOT PASSIVE ACTIVITIES
PAIN_FUNCTIONAL_ASSESSMENT: PREVENTS OR INTERFERES WITH MANY ACTIVE NOT PASSIVE ACTIVITIES

## 2022-02-22 ASSESSMENT — PAIN DESCRIPTION - DESCRIPTORS: DESCRIPTORS: PENETRATING;PINS AND NEEDLES

## 2022-02-22 ASSESSMENT — PAIN DESCRIPTION - PAIN TYPE
TYPE: ACUTE PAIN

## 2022-02-22 ASSESSMENT — PAIN DESCRIPTION - FREQUENCY
FREQUENCY: CONTINUOUS
FREQUENCY: CONTINUOUS

## 2022-02-22 ASSESSMENT — PAIN DESCRIPTION - ONSET
ONSET: ON-GOING
ONSET: ON-GOING

## 2022-02-22 ASSESSMENT — PAIN SCALES - GENERAL
PAINLEVEL_OUTOF10: 2
PAINLEVEL_OUTOF10: 8
PAINLEVEL_OUTOF10: 9
PAINLEVEL_OUTOF10: 2
PAINLEVEL_OUTOF10: 7
PAINLEVEL_OUTOF10: 8

## 2022-02-22 ASSESSMENT — PAIN DESCRIPTION - LOCATION
LOCATION: NECK

## 2022-02-22 ASSESSMENT — PAIN DESCRIPTION - ORIENTATION
ORIENTATION: POSTERIOR
ORIENTATION: POSTERIOR

## 2022-02-22 NOTE — PROGRESS NOTES
[facility]  Internal Medicine Residency Program  Progress Note - House Team     Patient:  Lucero Mitchell 33 y.o. male MRN: [id number]     Date of Service: 2/22/2022     CC: Left UE/Shoulder/Neck pain     Subjective   Brief Summary:  The patient is a 33 y.o. male , with past medical history of Crohn's disease, presented with left neck, shoulder, and left arm pain that began last Tuesday. He presents after being seen in the [facility] ED. Hospital Day: 3    Overnight Events:   Admitted to [facility] Patient was very anxious overnight and asked for a 1 mg dose of ativan that he states he was previously treated with years ago    He has a conflict with nursing staff not allowing his significant other to stay over night     This AM     Patient was seen and examined this morning at bedside.  Previously reported severe pain with any movement, persistent tingling described as feeling like his arm is falling asleep as well as general discomfort in most positions has decreased since admission   o Most notable the tingling sensation that was most bothersome has improved    He still notes that any pulling motion is particularly painful for him - he was asked to do this motion during PT yesterday and it was extremely painful.     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 and how much time he would have to take away  o Patient was advised that he should look into short term disability as he will likely need weeks of intensive outpatient PT    Patient does endorses some pertinent family history   o Mother: degenerative disc disease and another muscular disorder   o Sister: unspecified muscular disease   o He will check with family for more information about the muscular disorder his mother and sister have     Objective     Physical Exam:  · Vitals: /76   Pulse 65   Temp 98.7 °F (37.1 °C) (Temporal)   Resp 16   Ht 6' (1.829 m)   Wt 195 lb (88.5 kg)   SpO2 97%   BMI 26.45 kg/m²     I & O - 24hr: I/O this shift:  In: 360 [P.O.:360]  · Out: -    · General Appearance: alert, appears stated age, cooperative and moderate distress  · HEENT:  Head: Normal, normocephalic, atraumatic. · Eye: Normal external eye, conjunctiva, lids cornea, COSTA. · Ears: hearing equal bilaterally   · Nose: Normal external nose, mucus membranes and septum. · Pharynx: Dental Hygiene adequate. Normal buccal mucosa. Normal pharynx. · Neck / Thyroid: Supple, no masses, nodes, nodules or enlargement. · Neck: no JVD, supple, symmetrical, trachea midline, thyroid not enlarged, symmetric, no tenderness/mass/nodules and pain/stiffness/decreaed ROM present   · Lung: clear to auscultation bilaterally  · Heart: regular rate and rhythm, S1, S2 normal, no murmur, click, rub or gallop  · Abdomen: soft, non-tender; bowel sounds normal; no masses,  no organomegaly  · Extremities:  autraumaic, without edema, decreased ROM in all directions in RUE, LUE 4/5 strength, RUE 5/5 strength, LLE/RLE 5/5 strength, sensation intact BL UE and LE, discriminitive touch intact, no gross joint deformity or skin changes noted, biceps and brachioradialis reflexes 2/4 BL  · Musculokeletal: No joint swelling, no muscle tenderness. ROM normal in all joints of extremities.    · Neurologic: Mental status: Alert, oriented, thought content appropriate, affect:  generally upset and mildly agitated - congruent with mood   Subject  Pertinent Labs & Imaging Studies   chuy  CBC:   Lab Results Component Value Date    WBC 13.3 02/20/2022    RBC 4.90 02/20/2022    HGB 14.9 02/20/2022    HCT 44.5 02/20/2022    MCV 90.8 02/20/2022    MCH 30.4 02/20/2022    MCHC 33.5 02/20/2022    RDW 11.9 02/20/2022     02/20/2022    MPV 11.8 02/20/2022     CBC with Differential:    Lab Results   Component Value Date    WBC 13.3 02/20/2022    RBC 4.90 02/20/2022    HGB 14.9 02/20/2022    HCT 44.5 02/20/2022     02/20/2022    MCV 90.8 02/20/2022    MCH 30.4 02/20/2022    MCHC 33.5 02/20/2022    RDW 11.9 02/20/2022    LYMPHOPCT 8.2 02/20/2022    MONOPCT 1.4 02/20/2022    BASOPCT 0.2 02/20/2022    MONOSABS 0.19 02/20/2022    LYMPHSABS 1.09 02/20/2022    EOSABS 0.01 02/20/2022    BASOSABS 0.02 02/20/2022     CMP:    Lab Results   Component Value Date     02/20/2022    K 4.2 02/20/2022     02/20/2022    CO2 21 02/20/2022    BUN 13 02/20/2022    CREATININE 0.7 02/20/2022    GFRAA >60 02/20/2022    LABGLOM >60 02/20/2022    GLUCOSE 128 02/20/2022    PROT 7.8 02/20/2022    LABALBU 4.9 02/20/2022    CALCIUM 9.5 02/20/2022    BILITOT 0.2 02/20/2022    ALKPHOS 58 02/20/2022    AST 13 02/20/2022    ALT 11 02/20/2022     BMP:    Lab Results   Component Value Date     02/20/2022    K 4.2 02/20/2022     02/20/2022    CO2 21 02/20/2022    BUN 13 02/20/2022    LABALBU 4.9 02/20/2022    CREATININE 0.7 02/20/2022    CALCIUM 9.5 02/20/2022    GFRAA >60 02/20/2022    LABGLOM >60 02/20/2022    GLUCOSE 128 02/20/2022       Magnesium:    Lab Results   Component Value Date    MG 1.6 02/13/2018       U/A:   Lab Results   Component Value Date    COLORU Yellow 02/21/2022    PROTEINU Negative 02/21/2022    PHUR 5.5 02/21/2022    WBCUA 0-1 02/21/2022    RBCUA NONE 02/21/2022    MUCUS Present 02/21/2022    BACTERIA RARE 02/21/2022    CLARITYU Clear 02/21/2022    SPECGRAV 1.025 02/21/2022    LEUKOCYTESUR Negative 02/21/2022    UROBILINOGEN 0.2 02/21/2022    BILIRUBINUR Negative 02/21/2022    BLOODU Negative 02/21/2022 GLUCOSEU Negative 02/21/2022     TSH:    Lab Results   Component Value Date    TSH 1.910 02/21/2022       CTA CHEST W CONTRAST   Final Result   Mild ectasia of the ascending thoracic aorta at 3.0 cm with no evidence of   dissection or intimal flap. Minimal scarring at the left lung base with no   acute intrathoracic abnormality. XR CHEST PORTABLE   Final Result   No acute process. MRI THORACIC SPINE WO CONTRAST   Final Result   Small right-sided disc protrusion at the T7/T8 level creating minimal mass   effect on the thecal sac and spinal cord with no evidence of nerve root   contact or spinal cord compromise. No abnormal signal.  The remainder of the   thoracic spine is grossly unremarkable. MRI CERVICAL SPINE WO CONTRAST   Final Result   There is a disc osteophyte complex on the left at the C5/C6 level creating   some narrowing of the left neural foramina with no definite nerve root   contact or compromise. Broad-based disc bulge with central protrusion at the C6/C7 level creating   mass effect anteriorly on the thecal sac and spinal cord with no significant   lateralization or narrowing of the neural foramina. No definite nerve root   compromise present. EMG Results   Impression: \"This was a normal study of the left upper extremity. There was no evidence of suggest a left C5-T1 motor radiculopathy, left brachial plexopathy, left upper extremity mononeuropathy, or any electrodiagnostic evidence of a myopathy. However, given the patient's timeline of symptoms there is a high chance of falsely negative findings. Should his symptoms persist longer than two weeks repeating this exam may provide some utility. \"       Assessment and Plan     Assessment and Plan:    1. Left Arm/Shoulder Pain 2/2 Disc herniation C5-C6 - atraumatic   a. Neurosurgery Consulted - follow recommendations  i. No acute intervention planned   ii.  Consider work up for myositis due to pre-existing Crohn's   b. Pain Management   i. Continue PRN Tylenol 650 mg   ii. Continue Ibuprofen 400 mg Q8 with meals   iii. Gabapentin 300 mg PO QHS x3 days - BID if symptoms persist  iv. Continue cyclobenzaprine (flexeril) 10 mg TID prn   v. Pain management consulted  1. Patient states he takes Vicodin at home - got it from [facility] and only took one dose   2. Follow recommendations above    3. Will follow patient post D/C   c. Continue CMP/CBC QoD   d. Warm compress as needed   e. Dexamethasone 10 mg PO QD (switched from IV to PO 2/22/22)    f. Key Labs  i. Aldolase - pending   ii. TSH 1.910   iii. Vitamin D 25 hydroxy 19 - supplementation ordered   iv. UA resulted above   g. Last  (in ED) - current   h. Last ESR 0.3   i. Last CRP 17  j. OMT as tolerated - soft tissue, indirect techniques, myofascial release    k. PT/OT Following   i. Recommendations  1. OT - demonstrated decreased in independence and safety in completing ADLs   2. PT - patient has no acute PT needed,  follow up outpatient for rehab to neck and LUE   ii. Specific OT recommendations for patient's needs after requested   iii. Last PT AM-PAC 22/24     OT AM-PAC 19/24  l. Myositis Work Up   i. EMG Resulted above - normal UE with high rate of false negative - repeat exam in 2 weeks if symptoms persist   ii. Consider MRI and/or Muscle Biopsy and antibody studies - Anti-Kell/SNP/Mi-2/PL-7/PL-12  iii. Workup not continued at this time     2. Vitamin D Deficiency   a. Vitamin D level - 19   b. 1000 units Cholecalciferol PO QD    3. Anxiety   a. Patient reports he has received ativan in past without AE, SSRIs caused tremors  b. Ativan 0.5 mg Q6 PRN  c. Advise outpatient treatment for long term management     4. History of Crohn's Disease   a. Not being treated outpatient and not current complaints   i. Last Flare: 4 years ago   ii. Has been untreated due to lack of medical insurance for 2-3 years   b. Monitor for signs of flare   c.  Patient was given Dexamethasone 10 mg in ED - continue     5. History of Tobacco Use   a. 1 ppd smoker   b. Nicotine patch PRN       Diet: Regular diet   PT/OT evaluation: consulted and following - AM-PAC score 22/24   DVT prophylaxis/ GI prophylaxis: Lovonox + Protonix   Disposition: continue inpatient care     Harsha Ramon, MS-3   Attending physician: Dr. Luke Terrazas  Department of Internal Medicine  Internal Medicine Residency / [address] [place] [address]. case was discussed, including pertinent history and examination findings with the multidisciplinary team during bedside rounds. I have seen and examined the patient and the key elements of the encounter have been performed by myself. I have read and reviewed the documentation. If needed or disputed the following findings, counseling and treatment options which have been corrected and communicated back to the patient, family if applicable and contributing physicians. I agree with the assessment, plan and orders as noted by the resident.       Jag Williamson DO , Bella Almendarez  Professor of Internal Medicine  Pulmonary, Critical Care & Sleep Medicine  Internal Medicine Academic Faculty

## 2022-02-22 NOTE — PROGRESS NOTES
[facility]  Internal Medicine Residency Program  Progress Note - House Team     Patient:  Kate Arredondo 33 y.o. male MRN: [id number]     Date of Service: 2/22/2022     CC: Left arm pain and numbness   Overnight events: None     Subjective     Patient was seen and examined this morning at bedside in no acute distress. Overnight patient complained of agitation and was given one dose of ativan. When seen this morning, patient states that his arm pain has improved and notes that he has less tingling in his left hand. Patient still having neck pain that he is most concerned with along with anxiety, plan to have OT work with patient. Will optimize medical management and have patient follow with Physical therapy as an outpatient. Objective     Physical Exam:  · Vitals: /74   Pulse 80   Temp 97.6 °F (36.4 °C) (Temporal)   Resp 16   Ht 6' (1.829 m)   Wt 195 lb (88.5 kg)   SpO2 96%   BMI 26.45 kg/m²     · I & O - 24hr: No intake/output data recorded. Physical Exam  Constitutional:       Appearance: Normal appearance. HENT:      Head: Normocephalic. Eyes:      Pupils: Pupils are equal, round, and reactive to light. Cardiovascular:      Rate and Rhythm: Regular rhythm. Pulses: Normal pulses. Heart sounds: Normal heart sounds. Pulmonary:      Effort: Pulmonary effort is normal.      Breath sounds: Normal breath sounds. Abdominal:      General: Abdomen is flat. Bowel sounds are normal. There is no distension. Palpations: Abdomen is soft. Tenderness: There is no abdominal tenderness. Musculoskeletal:      Cervical back: Normal range of motion. Skin:     General: Skin is warm and dry. Capillary Refill: Capillary refill takes less than 2 seconds. Neurological:      General: No focal deficit present. Mental Status: He is alert and oriented to person, place, and time.       Comments: Patient had normal sensation bilaterally in the upper extremities. Strength was a 5/5 on the right and a 4/5 on the left. Subject  Pertinent Labs & Imaging Studies   chuy  CBC:   Lab Results   Component Value Date    WBC 13.3 02/20/2022    RBC 4.90 02/20/2022    HGB 14.9 02/20/2022    HCT 44.5 02/20/2022    MCV 90.8 02/20/2022    MCH 30.4 02/20/2022    MCHC 33.5 02/20/2022    RDW 11.9 02/20/2022     02/20/2022    MPV 11.8 02/20/2022     CMP:    Lab Results   Component Value Date     02/20/2022    K 4.2 02/20/2022     02/20/2022    CO2 21 02/20/2022    BUN 13 02/20/2022    CREATININE 0.7 02/20/2022    GFRAA >60 02/20/2022    LABGLOM >60 02/20/2022    GLUCOSE 128 02/20/2022    PROT 7.8 02/20/2022    LABALBU 4.9 02/20/2022    CALCIUM 9.5 02/20/2022    BILITOT 0.2 02/20/2022    ALKPHOS 58 02/20/2022    AST 13 02/20/2022    ALT 11 02/20/2022     BMP:    Lab Results   Component Value Date     02/20/2022    K 4.2 02/20/2022     02/20/2022    CO2 21 02/20/2022    BUN 13 02/20/2022    LABALBU 4.9 02/20/2022    CREATININE 0.7 02/20/2022    CALCIUM 9.5 02/20/2022    GFRAA >60 02/20/2022    LABGLOM >60 02/20/2022    GLUCOSE 128 02/20/2022     Sodium:    Lab Results   Component Value Date     02/20/2022     Potassium:    Lab Results   Component Value Date    K 4.2 02/20/2022       CTA CHEST W CONTRAST   Final Result   Mild ectasia of the ascending thoracic aorta at 3.0 cm with no evidence of   dissection or intimal flap. Minimal scarring at the left lung base with no   acute intrathoracic abnormality. XR CHEST PORTABLE   Final Result   No acute process. MRI THORACIC SPINE WO CONTRAST   Final Result   Small right-sided disc protrusion at the T7/T8 level creating minimal mass   effect on the thecal sac and spinal cord with no evidence of nerve root   contact or spinal cord compromise. No abnormal signal.  The remainder of the   thoracic spine is grossly unremarkable.          MRI CERVICAL SPINE WO CONTRAST   Final Result key elements of the encounter were performed by me (> 85 % time). The medications & laboratory data was discussed and adjusted where necessary. The radiographic images were reviewed or with radiologist or consultant if felt dis-concordant with the exam or history. The above findings were corroborated, plans confirmed and changes made if needed. Family is updated at the bedside as available. Key issues of the case were discussed among consultants. Critical Care time is documented if appropriate.       Marcela García DO, FACP, FCCP, [facility],

## 2022-02-22 NOTE — CARE COORDINATION
2/22/22 Update CM note: Patient to remain one more day due to poor pain control. He is being followed by pain management for outpatient treatment. He is on decadron. He will have outpatient therapy per the pcp.  Electronically signed by Sagar Chen RN CM on 2/22/2022 at 12:58 PM

## 2022-02-23 VITALS
WEIGHT: 195 LBS | OXYGEN SATURATION: 99 % | HEART RATE: 64 BPM | HEIGHT: 72 IN | TEMPERATURE: 97.5 F | DIASTOLIC BLOOD PRESSURE: 83 MMHG | BODY MASS INDEX: 26.41 KG/M2 | SYSTOLIC BLOOD PRESSURE: 132 MMHG | RESPIRATION RATE: 16 BRPM

## 2022-02-23 PROCEDURE — 6370000000 HC RX 637 (ALT 250 FOR IP)

## 2022-02-23 PROCEDURE — 6370000000 HC RX 637 (ALT 250 FOR IP): Performed by: INTERNAL MEDICINE

## 2022-02-23 PROCEDURE — 6370000000 HC RX 637 (ALT 250 FOR IP): Performed by: STUDENT IN AN ORGANIZED HEALTH CARE EDUCATION/TRAINING PROGRAM

## 2022-02-23 PROCEDURE — 2580000003 HC RX 258: Performed by: STUDENT IN AN ORGANIZED HEALTH CARE EDUCATION/TRAINING PROGRAM

## 2022-02-23 PROCEDURE — 6360000002 HC RX W HCPCS: Performed by: INTERNAL MEDICINE

## 2022-02-23 PROCEDURE — 99239 HOSP IP/OBS DSCHRG MGMT >30: CPT | Performed by: INTERNAL MEDICINE

## 2022-02-23 RX ORDER — DEXAMETHASONE 2 MG/1
TABLET ORAL
Qty: 31 TABLET | Refills: 0 | Status: SHIPPED | OUTPATIENT
Start: 2022-02-24 | End: 2022-03-08

## 2022-02-23 RX ORDER — IBUPROFEN 400 MG/1
400 TABLET ORAL
Qty: 42 TABLET | Refills: 0 | Status: SHIPPED | OUTPATIENT
Start: 2022-02-23 | End: 2022-03-09

## 2022-02-23 RX ORDER — GABAPENTIN 300 MG/1
300 CAPSULE ORAL 2 TIMES DAILY
Qty: 28 CAPSULE | Refills: 0 | Status: SHIPPED | OUTPATIENT
Start: 2022-02-23 | End: 2022-02-23 | Stop reason: HOSPADM

## 2022-02-23 RX ORDER — CYCLOBENZAPRINE HCL 10 MG
10 TABLET ORAL 3 TIMES DAILY PRN
Qty: 21 TABLET | Refills: 0 | Status: SHIPPED | OUTPATIENT
Start: 2022-02-23 | End: 2022-03-09

## 2022-02-23 RX ORDER — GABAPENTIN 300 MG/1
300 CAPSULE ORAL 2 TIMES DAILY
Qty: 28 CAPSULE | Refills: 0 | Status: SHIPPED | OUTPATIENT
Start: 2022-02-23 | End: 2022-03-09

## 2022-02-23 RX ORDER — VITAMIN B COMPLEX
1000 TABLET ORAL DAILY
Qty: 30 TABLET | Refills: 0 | Status: SHIPPED | OUTPATIENT
Start: 2022-02-24

## 2022-02-23 RX ADMIN — CYCLOBENZAPRINE 10 MG: 10 TABLET, FILM COATED ORAL at 09:55

## 2022-02-23 RX ADMIN — LORAZEPAM 0.5 MG: 0.5 TABLET ORAL at 00:56

## 2022-02-23 RX ADMIN — IBUPROFEN 400 MG: 400 TABLET, FILM COATED ORAL at 12:51

## 2022-02-23 RX ADMIN — GABAPENTIN 300 MG: 300 CAPSULE ORAL at 09:57

## 2022-02-23 RX ADMIN — ACETAMINOPHEN 650 MG: 325 TABLET ORAL at 00:56

## 2022-02-23 RX ADMIN — DEXAMETHASONE 10 MG: 4 TABLET ORAL at 09:57

## 2022-02-23 RX ADMIN — Medication 1000 UNITS: at 09:56

## 2022-02-23 RX ADMIN — IBUPROFEN 400 MG: 400 TABLET, FILM COATED ORAL at 09:56

## 2022-02-23 RX ADMIN — SODIUM CHLORIDE, PRESERVATIVE FREE 10 ML: 5 INJECTION INTRAVENOUS at 09:58

## 2022-02-23 ASSESSMENT — PAIN DESCRIPTION - PAIN TYPE: TYPE: ACUTE PAIN

## 2022-02-23 ASSESSMENT — PAIN DESCRIPTION - FREQUENCY: FREQUENCY: CONTINUOUS

## 2022-02-23 ASSESSMENT — PAIN DESCRIPTION - DIRECTION: RADIATING_TOWARDS: SHOULDER

## 2022-02-23 ASSESSMENT — PAIN DESCRIPTION - ONSET: ONSET: ON-GOING

## 2022-02-23 ASSESSMENT — PAIN - FUNCTIONAL ASSESSMENT: PAIN_FUNCTIONAL_ASSESSMENT: PREVENTS OR INTERFERES SOME ACTIVE ACTIVITIES AND ADLS

## 2022-02-23 ASSESSMENT — PAIN DESCRIPTION - ORIENTATION: ORIENTATION: POSTERIOR

## 2022-02-23 ASSESSMENT — PAIN SCALES - GENERAL
PAINLEVEL_OUTOF10: 6
PAINLEVEL_OUTOF10: 6
PAINLEVEL_OUTOF10: 7
PAINLEVEL_OUTOF10: 7

## 2022-02-23 ASSESSMENT — PAIN DESCRIPTION - LOCATION: LOCATION: NECK

## 2022-02-23 ASSESSMENT — PAIN DESCRIPTION - PROGRESSION: CLINICAL_PROGRESSION: GRADUALLY IMPROVING

## 2022-02-23 NOTE — PROGRESS NOTES
Patient given discharge paperwork. meds are no longer being filled at our pharmacy because he does not have the co-pay. He spoke with a member from our inpatient pharmacy and they are sending his medications to [facility] near his home. Told patient I would get a discharge wheelchair set up to take him down to the main entrance. Patient declined wheelchair and states he would prefer to walk.

## 2022-02-23 NOTE — DISCHARGE SUMMARY
[address]  Discharge Summary    PCP: Brandon Talley DO    Admit Date:2/20/2022  Discharge Date: 2/23/2022    Admission Diagnosis:   1. Left Arm Pain 2/2 cervical stenosis vs myositis  2. Hx Crohns Disease     Discharge Diagnosis:  1. Left arm and shoulder pain 2/2 C5/6 disc herniation  2. Hx Crohns Disease     Hospital Course: The patient is a 33 y.o. male , with past medical history of Crohn's disease, presented with left neck, shoulder, and left arm pain that started since last Tuesday. According to the patient's  last Tuesday he started having left arm electric shocklike pain. Patient mentioned the pain was not that much severe and he ignored the pain. Next morning patient was having excruciating pain that started from back of his head and his shoulder. And slowly traveling to the left arm and he was not able to lift his shoulder up. Any movement in the left arm was increasing the pain. Patient mentioned he was always feeling this tingling and numbness sensation on his left arm and there a few times when he feels like his arm is giving up. 1 week ago patient was having a lump on her left wrist and was unable to move wrist joint but that has been resolved. Patient denied any sensory loss or motor loss during this time. With this complaint, patient was monitored closely during ED and ordered CT scan. The CT scan which showed bulging herniated disc of C5-C6, patient was ordered dexamethasone and opiate pain medication and discharged home. But patient's pain did not improve. On past medical history, patient had a history of Crohn's diseaseBut patient did not take any medication for Crohn's because he was not being able to afford the medication. Patient's last flare of Crohn's disease was almost 2 years ago. Patient mention he almost always have diarrhea but no constipation, no blood per rectum.   Patient denies any nausea, vomiting, abdominal pain.  Patient mention he is a current smoker but 1 pack last for 10 days. Patient also mention he drinks a pack of beer every Tuesday. Does not use any kind of illicit drug. ED Course: In ED, patient's HR was  98, heart rate was 76, respiratory rate was 16 and blood pressure was 127/76. Patient's CBC, CMP was unremarkable. CTA chest showed mild ectasia of the ascending aorta at 3 cm without any evidence of dissection or intimal flap. MRI of the thoracic spine showed small right-sided disc protrusion at T7/T8 level creating minimal mass-effect on the thecal sac and spinal cord. No nerve root compression was noticed. MRI cervical spine showed disc osteophyte complex on the left at C5-C6 level creating narrowing of the left neural foramina with no definitive nerve root compromise. Neurosurgery was consulted in the ED, per neurosurgery note no acute intervention at this point due to not contributing as neurosurgical emergency. ED Meds: Patient was given hydrocodone acetaminophen, Ativan, dexamethasone 10 mg. Patient was admitted for observation, he was started on Flexeril and ketorolac to start, noting little pain relief. PT/OT was consulted to work with the patient. Patient was also seen by pain management and rehabilitation, they recommended physical therapy continuing the Flexeril adding ibuprofen and gabapentin. Patient was then started on the regimen of Flexeril gabapentin and ibuprofen, following this initiation of treatment patient pain seem to have improved notices less tingling. Patient also complained of anxiety on admission, was given small dose of as needed Ativan for his agitation. On third day of hospital course patient noticed significant improvement in his numbness tingling and strength in his left arm he was able to now left over his head and could tolerate some exercises.   Patient will be sent home with plan of attending physical therapy and 14-day courses of Flexeril gabapentin ibuprofen. Patient will also be sent home on dexamethasone with a 10-day tapering course. Patient is to follow-up with his primary care physician who will help tailor his medication regimen, patient is also to follow-up with physical therapy and occupational therapy to work on improving strength and conditioning. Significant findings (history and exam, laboratory, radiological, pathology, other tests):   Physical Exam  HENT:      Head: Normocephalic. Eyes:      Pupils: Pupils are equal, round, and reactive to light. Cardiovascular:      Rate and Rhythm: Normal rate and regular rhythm. Pulses: Normal pulses. Heart sounds: Normal heart sounds. Pulmonary:      Effort: Pulmonary effort is normal.      Breath sounds: Normal breath sounds. Abdominal:      General: Abdomen is flat. Bowel sounds are normal. There is no distension. Palpations: Abdomen is soft. Tenderness: There is no abdominal tenderness. Musculoskeletal:         General: Normal range of motion. Cervical back: Normal range of motion. Back:       Comments: Some weakness noted in the left arm 4/5 on the left compared to 5/5 on the right   Skin:     General: Skin is warm and dry. Capillary Refill: Capillary refill takes less than 2 seconds. Neurological:      General: No focal deficit present. Mental Status: He is alert and oriented to person, place, and time. ·     Pending test results:   1. None    Consults:  1. PMR  2. Neurosurgery  3. PT/OT    Condition at discharge: Stable    Disposition: home    Discharge Medications:     Medication List      START taking these medications    dexamethasone 2 MG tablet  Commonly known as: DECADRON  Take 5 tablets by mouth daily for 2 days, THEN 4 tablets daily for 2 days, THEN 3 tablets daily for 2 days, THEN 2 tablets daily for 2 days, THEN 1 tablet daily for 2 days, THEN 0.5 tablets daily for 2 days.   Start taking on: February 24, 2022     gabapentin 300 MG capsule  Commonly known as: NEURONTIN  Take 1 capsule by mouth 2 times daily for 14 days. Vitamin D 25 MCG (1000 UT) Tabs tablet  Commonly known as: CHOLECALCIFEROL  Take 1 tablet by mouth daily  Start taking on: February 24, 2022        CHANGE how you take these medications    ibuprofen 400 MG tablet  Commonly known as: ADVIL;MOTRIN  Take 1 tablet by mouth 3 times daily (with meals) for 14 days  What changed:   · medication strength  · how much to take  · when to take this  · reasons to take this        CONTINUE taking these medications    cyclobenzaprine 10 MG tablet  Commonly known as: FLEXERIL  Take 1 tablet by mouth 3 times daily as needed for Muscle spasms        STOP taking these medications    HYDROcodone-acetaminophen 5-325 MG per tablet  Commonly known as: Norco     predniSONE 10 MG tablet  Commonly known as: Reda Endo           Where to Get Your Medications      These medications were sent to [facility], [address] [address]    Phone: [phone]   · gabapentin 300 MG capsule     These medications were sent to [facility], [address] [address]    Phone: [phone]   · cyclobenzaprine 10 MG tablet  · dexamethasone 2 MG tablet  · ibuprofen 400 MG tablet  · Vitamin D 25 MCG (1000 UT) Tabs tablet          Internal medicine    Follow ups   Please follow up with your primary care physician ( Dr.Patsy Chanda DO) within 10 days of discharge from hospital. Please call as soon as possible to make an appointment. Please contact the internal medicine clinic for an appointment if you are unable to get an appointment with your PCP.     Please keep all other follow up appointments:  o PT/OT    Changes in healthcare    Please take all medications as indicated above   Diet: regular diet    Activity: activity as tolerated   New medication prescribed after this hospitalization are Flexiril, gabapentin and ibuprofen, Please refer to your Med list for details    Please contact us if you have any concerns, wish to change or make an appointment:  [facility]    Phone: [phone]   Fax: [phone] [place] [address] [address]   Or please call the nurse line at [phone].  Should you have further questions in regards to this visit, you can review your clinical note and after visit summary document on your [employer] account. Other questions can be directed to our nurse line at [phone].  Other than any new prescriptions given to you today, the list of home medications on this After Visit Summary are based on information provided to us from you and your healthcare providers. This information, including the list, dose, and frequency of medications is only as accurate as the information you provided. If you have any questions or concerns about your home medications, please contact your Primary Care Physician for further clarification. Arron Trevino MD  PGY-1   5:09 PM 2/23/2022    [facility]  Department of Internal Medicine  Internal Medicine Residency / [address] [place] [address]. case was discussed, including pertinent history and examination findings with the multidisciplinary team during bedside rounds. I have seen and examined the patient and the key elements of the encounter have been performed by myself. I have read and reviewed the documentation. If needed or disputed the following findings, counseling and treatment options which have been corrected and communicated back to the patient, family if applicable and contributing physicians. I agree with the assessment, plan and orders as noted by the resident.       Nabil Clark DO , Ashley Soto  Professor of Internal Medicine  Pulmonary, Critical Care & Sleep Medicine  Internal Medicine Academic Faculty

## 2022-02-23 NOTE — PLAN OF CARE
Problem: Pain:  Goal: Pain level will decrease  Description: Pain level will decrease  Outcome: Ongoing  Goal: Control of acute pain  Description: Control of acute pain  Outcome: Ongoing  Goal: Control of chronic pain  Description: Control of chronic pain  Outcome: Ongoing     Problem: Musculor/Skeletal Functional Status  Goal: Highest potential functional level  Outcome: Ongoing  Goal: Absence of falls  Outcome: Ongoing     Problem: Falls - Risk of:  Goal: Will remain free from falls  Description: Will remain free from falls  Outcome: Ongoing  Goal: Absence of physical injury  Description: Absence of physical injury  Outcome: Ongoing

## 2022-02-23 NOTE — PROGRESS NOTES
Patient states that Dr. Santa Lopez wanted him to find out the medical dx that his mother and sister have. 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

## 2022-02-23 NOTE — PROGRESS NOTES
[facility]  Internal Medicine Residency Program  Progress Note - House Team     Patient:  Ricki Elizondo 33 y.o. male MRN: [id number]     Date of Service: 2/23/2022     CC: Left UE/Shoulder/Neck pain     Subjective   Brief Summary:  The patient is a 33 y.o. male , with past medical history of Crohn's disease, presented with left neck, shoulder, and left arm pain that began last Tuesday. He presents after being seen in the [facility] ED. Hospital Day: 4    Overnight Events:   Admitted to 1    No significant events overnight     This AM     Patient was seen and examined this morning at bedside.  Patient endorses continued decrease in the pain as well as the tingling    He notes increased range of motion and was observed being able to reach behind his head without pain    Patient is amendable to any and all outpatient PT needed to return to normal function    He would like to go home today with instructions on what position/motions/activies he should do and what to avoid     Objective     Physical Exam:  · Vitals: /83   Pulse 64   Temp 97.5 °F (36.4 °C) (Temporal)   Resp 16   Ht 6' (1.829 m)   Wt 195 lb (88.5 kg)   SpO2 99%   BMI 26.45 kg/m²     I & O - 24hr: I/O this shift:  In: 180 [P.O.:180]  · Out: 300 [Urine:300]   · General Appearance: alert, appears stated age, cooperative and moderate distress  · HEENT:  Head: Normal, normocephalic, atraumatic. · Eye: Normal external eye, conjunctiva, lids cornea, COSTA. · Ears: hearing equal bilaterally   · Nose: Normal external nose, mucus membranes and septum. · Pharynx: Dental Hygiene adequate. Normal buccal mucosa. Normal pharynx. · Neck / Thyroid: Supple, no masses, nodes, nodules or enlargement.   · Neck: no JVD, supple, symmetrical, trachea midline, thyroid not enlarged, symmetric, no tenderness/mass/nodules and pain/stiffness/decreaed ROM present   · Lung: clear to auscultation bilaterally  · Heart: regular rate and rhythm, S1, S2 normal, no murmur, click, rub or gallop  · Abdomen: soft, non-tender; bowel sounds normal; no masses,  no organomegaly  · Extremities:  autraumaic, without edema, decreased ROM in all directions in RUE - improved over previous days, LUE 4/5 strength, RUE 5/5 strength, LLE/RLE 5/5 strength, sensation intact BL UE and LE, discriminitive touch intact, no gross joint deformity or skin changes noted, biceps and brachioradialis reflexes 2/4 BL  · Musculokeletal: No joint swelling, no muscle tenderness. ROM normal in all joints of extremities.    · Neurologic: Mental status: Alert, oriented, thought content appropriate, affect:  generally upset and mildly agitated - congruent with mood   Subject  Pertinent Labs & Imaging Studies   chuy  CBC:   Lab Results   Component Value Date    WBC 13.3 02/20/2022    RBC 4.90 02/20/2022    HGB 14.9 02/20/2022    HCT 44.5 02/20/2022    MCV 90.8 02/20/2022    MCH 30.4 02/20/2022    MCHC 33.5 02/20/2022    RDW 11.9 02/20/2022     02/20/2022    MPV 11.8 02/20/2022     CBC with Differential:    Lab Results   Component Value Date    WBC 13.3 02/20/2022    RBC 4.90 02/20/2022    HGB 14.9 02/20/2022    HCT 44.5 02/20/2022     02/20/2022    MCV 90.8 02/20/2022    MCH 30.4 02/20/2022    MCHC 33.5 02/20/2022    RDW 11.9 02/20/2022    LYMPHOPCT 8.2 02/20/2022    MONOPCT 1.4 02/20/2022    BASOPCT 0.2 02/20/2022    MONOSABS 0.19 02/20/2022    LYMPHSABS 1.09 02/20/2022    EOSABS 0.01 02/20/2022    BASOSABS 0.02 02/20/2022     CMP:    Lab Results   Component Value Date     02/20/2022    K 4.2 02/20/2022     02/20/2022    CO2 21 02/20/2022    BUN 13 02/20/2022    CREATININE 0.7 02/20/2022    GFRAA >60 02/20/2022    LABGLOM >60 02/20/2022    GLUCOSE 128 02/20/2022    PROT 7.8 02/20/2022    LABALBU 4.9 02/20/2022    CALCIUM 9.5 02/20/2022    BILITOT 0.2 02/20/2022    ALKPHOS 58 02/20/2022    AST 13 02/20/2022    ALT 11 02/20/2022     BMP:    Lab Results   Component Value Date     02/20/2022    K 4.2 02/20/2022     02/20/2022    CO2 21 02/20/2022    BUN 13 02/20/2022    LABALBU 4.9 02/20/2022    CREATININE 0.7 02/20/2022    CALCIUM 9.5 02/20/2022    GFRAA >60 02/20/2022    LABGLOM >60 02/20/2022    GLUCOSE 128 02/20/2022       Magnesium:    Lab Results   Component Value Date    MG 1.6 02/13/2018       U/A:   Lab Results   Component Value Date    COLORU Yellow 02/21/2022    PROTEINU Negative 02/21/2022    PHUR 5.5 02/21/2022    WBCUA 0-1 02/21/2022    RBCUA NONE 02/21/2022    MUCUS Present 02/21/2022    BACTERIA RARE 02/21/2022    CLARITYU Clear 02/21/2022    SPECGRAV 1.025 02/21/2022    LEUKOCYTESUR Negative 02/21/2022    UROBILINOGEN 0.2 02/21/2022    BILIRUBINUR Negative 02/21/2022    BLOODU Negative 02/21/2022    GLUCOSEU Negative 02/21/2022     TSH:    Lab Results   Component Value Date    TSH 1.910 02/21/2022       CTA CHEST W CONTRAST   Final Result   Mild ectasia of the ascending thoracic aorta at 3.0 cm with no evidence of   dissection or intimal flap. Minimal scarring at the left lung base with no   acute intrathoracic abnormality. XR CHEST PORTABLE   Final Result   No acute process. MRI THORACIC SPINE WO CONTRAST   Final Result   Small right-sided disc protrusion at the T7/T8 level creating minimal mass   effect on the thecal sac and spinal cord with no evidence of nerve root   contact or spinal cord compromise. No abnormal signal.  The remainder of the   thoracic spine is grossly unremarkable. MRI CERVICAL SPINE WO CONTRAST   Final Result   There is a disc osteophyte complex on the left at the C5/C6 level creating   some narrowing of the left neural foramina with no definite nerve root   contact or compromise. Broad-based disc bulge with central protrusion at the C6/C7 level creating   mass effect anteriorly on the thecal sac and spinal cord with no significant   lateralization or narrowing of the neural foramina.   No definite nerve root   compromise present. EMG Results   Impression: \"This was a normal study of the left upper extremity. There was no evidence of suggest a left C5-T1 motor radiculopathy, left brachial plexopathy, left upper extremity mononeuropathy, or any electrodiagnostic evidence of a myopathy. However, given the patient's timeline of symptoms there is a high chance of falsely negative findings. Should his symptoms persist longer than two weeks repeating this exam may provide some utility. \"       Assessment and Plan     Assessment and Plan:    1. Left Arm/Shoulder Pain 2/2 Disc herniation C5-C6 - atraumatic   a. Neurosurgery Consulted - follow recommendations  i. No acute intervention planned   ii. Consider work up for myositis due to pre-existing Crohn's   b. Pain Management   i. Continue PRN Tylenol 650 mg at home as needed   ii. Continue Ibuprofen 400 mg Q8 at home as needed   iii. Gabapentin 300 mg PO QHS x3 days   1. Change to BID at D/C for 1 month   iv. Continue cyclobenzaprine (flexeril) 10 mg TID prn   1. Continue at D/C   v. Pain management consulted  1. Patient states he takes Vicodin at home - got it from [facility] and only took one dose   2. Follow recommendations above    3. Will follow patient post D/C   c. Continue CMP/CBC QoD   d. Warm compress as needed   e. Dexamethasone 10 mg PO QD (switched from IV to PO 2/22/22)    i. 10 day taper at D/C   f. Key Labs  i. Aldolase - still pending at D/C   ii. TSH 1.910   iii. Vitamin D 25 hydroxy 19 - supplementation ordered   iv. UA resulted above   g. Last  (in ED) - current   h. Last ESR 0.3   i. Last CRP 17  j. OMT as tolerated - soft tissue, indirect techniques, myofascial release    k. PT/OT Following   i. Recommendations  1. OT - demonstrated decreased in independence and safety in completing ADLs  2. PT - patient has no acute PT needed,  follow up outpatient for rehab to neck and LUE   ii.  Specific OT recommendations for patient's if OT is unable to see patient prior to D/C   iii. Last PT AM-PAC 22/24     OT AM-PAC 19/24  l. Myositis Work Up   i. EMG Resulted above - normal UE with high rate of false negative - repeat exam in 2 weeks if symptoms persist   ii. Consider MRI and/or Muscle Biopsy and antibody studies - Anti-Kell/SNP/Mi-2/PL-7/PL-12  iii. Workup not continued at this time     2. Vitamin D Deficiency   a. Vitamin D level - 19   b. 1000 units Cholecalciferol PO QD  c. Continue on D/C     3. Anxiety   a. Patient reports he has received ativan in past without AE, SSRIs caused tremors  b. Ativan 0.5 mg Q6 PRN  i. Stop at D/C   ii. Advise patient to follow up outpatient with his PCP for further psychiatric management if desired     4. History of Crohn's Disease   a. Not being treated outpatient and not current complaints   i. Last Flare: 4 years ago   ii. Has been untreated due to lack of medical insurance for 2-3 years   b. Monitor for signs of flare   c. Patient was given Dexamethasone 10 mg in ED - taper at D/C   d. Advise follow up outpatient for management     5. History of Tobacco Use   a. 1 ppd smoker   b. Nicotine patch PRN       Diet: Regular diet   PT/OT evaluation: consulted and following - AM-PAC score 22/24   DVT prophylaxis/ GI prophylaxis: Lovonox + Protonix   Disposition: discharge     Flower Ballesteros, MS-3   Attending physician: Dr. Ruth Hoang  Department of Internal Medicine  Internal Medicine Residency / [facility]. case was discussed, including pertinent history and examination findings with the multidisciplinary team during bedside rounds. I have seen and examined the patient and the key elements of the encounter have been performed by myself. I have read and reviewed the documentation.  If needed or disputed the following findings, counseling and treatment options which have been corrected and communicated back to the patient, family if applicable and contributing physicians. I agree with the assessment, plan and orders as noted by the resident.       Shannan Azar DO , Inés Gonzales  Professor of Internal Medicine  Pulmonary, Critical Care & Sleep Medicine  Internal Medicine Academic Faculty

## 2022-02-27 LAB — ALDOLASE: 6.1 U/L (ref 1.2–7.6)

## 2022-02-28 ENCOUNTER — HOSPITAL ENCOUNTER (OUTPATIENT)
Dept: PHYSICAL THERAPY | Age: 32
Setting detail: THERAPIES SERIES
Discharge: HOME | End: 2022-02-28

## 2022-02-28 NOTE — PROGRESS NOTES
[facility]                Phone: [phone]  Fax: [phone]    Physical Therapy  Cancellation/No-show Note  Patient Name:  Aldo Baires  :  1990   Date:  2022    For today's appointment patient:  []  Cancelled  []  Rescheduled appointment  [x]  No-show     Reason given by patient:  []  Patient ill  []  Conflicting appointment  []  No transportation    []  Conflict with work  [x]  No reason given  []  Other:     Comments:  pt was a no-show for initial evaluation. Electronically signed by:   Ebenezer Pina PT

## 2023-04-04 DIAGNOSIS — R00.2 PALPITATIONS: ICD-10-CM

## 2023-04-04 DIAGNOSIS — E78.5 HYPERLIPIDEMIA, UNSPECIFIED HYPERLIPIDEMIA TYPE: ICD-10-CM

## 2023-04-04 LAB
ALBUMIN SERPL-MCNC: 4.7 G/DL (ref 3.5–5.2)
ALP SERPL-CCNC: 65 U/L (ref 40–129)
ALT SERPL-CCNC: 14 U/L (ref 0–40)
ANION GAP SERPL CALCULATED.3IONS-SCNC: 17 MMOL/L (ref 7–16)
AST SERPL-CCNC: 25 U/L (ref 0–39)
BASOPHILS # BLD: 0.06 E9/L (ref 0–0.2)
BASOPHILS NFR BLD: 0.8 % (ref 0–2)
BILIRUB SERPL-MCNC: 0.2 MG/DL (ref 0–1.2)
BUN SERPL-MCNC: 10 MG/DL (ref 6–20)
CALCIUM SERPL-MCNC: 10.1 MG/DL (ref 8.6–10.2)
CHLORIDE SERPL-SCNC: 105 MMOL/L (ref 98–107)
CHOLESTEROL, TOTAL: 184 MG/DL (ref 0–199)
CO2 SERPL-SCNC: 21 MMOL/L (ref 22–29)
CREAT SERPL-MCNC: 0.8 MG/DL (ref 0.7–1.2)
EOSINOPHIL # BLD: 0.34 E9/L (ref 0.05–0.5)
EOSINOPHIL NFR BLD: 4.8 % (ref 0–6)
ERYTHROCYTE [DISTWIDTH] IN BLOOD BY AUTOMATED COUNT: 12.2 FL (ref 11.5–15)
GLUCOSE SERPL-MCNC: 93 MG/DL (ref 74–99)
HCT VFR BLD AUTO: 44.1 % (ref 37–54)
HDLC SERPL-MCNC: 44 MG/DL
HGB BLD-MCNC: 14.4 G/DL (ref 12.5–16.5)
IMM GRANULOCYTES # BLD: 0.03 E9/L
IMM GRANULOCYTES NFR BLD: 0.4 % (ref 0–5)
LDLC SERPL CALC-MCNC: 111 MG/DL (ref 0–99)
LYMPHOCYTES # BLD: 2.04 E9/L (ref 1.5–4)
LYMPHOCYTES NFR BLD: 28.8 % (ref 20–42)
MCH RBC QN AUTO: 30.7 PG (ref 26–35)
MCHC RBC AUTO-ENTMCNC: 32.7 % (ref 32–34.5)
MCV RBC AUTO: 94 FL (ref 80–99.9)
MONOCYTES # BLD: 0.54 E9/L (ref 0.1–0.95)
MONOCYTES NFR BLD: 7.6 % (ref 2–12)
NEUTROPHILS # BLD: 4.07 E9/L (ref 1.8–7.3)
NEUTS SEG NFR BLD: 57.6 % (ref 43–80)
PLATELET # BLD AUTO: 239 E9/L (ref 130–450)
PMV BLD AUTO: 12.7 FL (ref 7–12)
POTASSIUM SERPL-SCNC: 5.1 MMOL/L (ref 3.5–5)
PROT SERPL-MCNC: 7.9 G/DL (ref 6.4–8.3)
RBC # BLD AUTO: 4.69 E12/L (ref 3.8–5.8)
SODIUM SERPL-SCNC: 143 MMOL/L (ref 132–146)
TRIGL SERPL-MCNC: 146 MG/DL (ref 0–149)
TSH SERPL-MCNC: 1.64 UIU/ML (ref 0.27–4.2)
VLDLC SERPL CALC-MCNC: 29 MG/DL
WBC # BLD: 7.1 E9/L (ref 4.5–11.5)

## 2023-05-22 ENCOUNTER — TELEPHONE (OUTPATIENT)
Dept: CARDIOLOGY | Age: 33
End: 2023-05-22

## 2024-05-13 PROCEDURE — 96374 THER/PROPH/DIAG INJ IV PUSH: CPT

## 2024-05-13 PROCEDURE — 96372 THER/PROPH/DIAG INJ SC/IM: CPT

## 2024-05-13 PROCEDURE — 93005 ELECTROCARDIOGRAM TRACING: CPT

## 2024-05-13 PROCEDURE — 99285 EMERGENCY DEPT VISIT HI MDM: CPT

## 2024-05-13 ASSESSMENT — PAIN - FUNCTIONAL ASSESSMENT
PAIN_FUNCTIONAL_ASSESSMENT: 0-10
PAIN_FUNCTIONAL_ASSESSMENT: 0-10

## 2024-05-13 ASSESSMENT — PAIN SCALES - GENERAL: PAINLEVEL_OUTOF10: 9

## 2024-05-13 ASSESSMENT — LIFESTYLE VARIABLES: HOW OFTEN DO YOU HAVE A DRINK CONTAINING ALCOHOL: NEVER

## 2024-05-13 ASSESSMENT — PAIN DESCRIPTION - FREQUENCY
FREQUENCY: CONTINUOUS
FREQUENCY: CONTINUOUS

## 2024-05-13 ASSESSMENT — PAIN DESCRIPTION - PAIN TYPE: TYPE: ACUTE PAIN

## 2024-05-14 ENCOUNTER — HOSPITAL ENCOUNTER (EMERGENCY)
Age: 34
Discharge: HOME | End: 2024-05-14
Attending: EMERGENCY MEDICINE
Payer: COMMERCIAL

## 2024-05-14 ENCOUNTER — APPOINTMENT (OUTPATIENT)
Dept: GENERAL RADIOLOGY | Age: 34
End: 2024-05-14
Payer: COMMERCIAL

## 2024-05-14 VITALS
HEIGHT: 72 IN | SYSTOLIC BLOOD PRESSURE: 130 MMHG | BODY MASS INDEX: 27.09 KG/M2 | WEIGHT: 200 LBS | HEART RATE: 88 BPM | TEMPERATURE: 98.9 F | RESPIRATION RATE: 18 BRPM | OXYGEN SATURATION: 98 % | DIASTOLIC BLOOD PRESSURE: 79 MMHG

## 2024-05-14 DIAGNOSIS — R07.9 CHEST PAIN, UNSPECIFIED TYPE: Primary | ICD-10-CM

## 2024-05-14 LAB
ALBUMIN SERPL-MCNC: 4.7 G/DL (ref 3.5–5.2)
ALP SERPL-CCNC: 73 U/L (ref 40–129)
ALT SERPL-CCNC: 13 U/L (ref 0–40)
ANION GAP SERPL CALCULATED.3IONS-SCNC: 13 MMOL/L (ref 7–16)
AST SERPL-CCNC: 14 U/L (ref 0–39)
BASOPHILS # BLD: 0.05 K/UL (ref 0–0.2)
BASOPHILS NFR BLD: 1 % (ref 0–2)
BILIRUB SERPL-MCNC: 0.2 MG/DL (ref 0–1.2)
BUN SERPL-MCNC: 14 MG/DL (ref 6–20)
CALCIUM SERPL-MCNC: 9.6 MG/DL (ref 8.6–10.2)
CHLORIDE SERPL-SCNC: 100 MMOL/L (ref 98–107)
CO2 SERPL-SCNC: 23 MMOL/L (ref 22–29)
CREAT SERPL-MCNC: 0.8 MG/DL (ref 0.7–1.2)
EKG ATRIAL RATE: 86 BPM
EKG P AXIS: 83 DEGREES
EKG P-R INTERVAL: 160 MS
EKG Q-T INTERVAL: 336 MS
EKG QRS DURATION: 88 MS
EKG QTC CALCULATION (BAZETT): 402 MS
EKG R AXIS: 55 DEGREES
EKG T AXIS: 53 DEGREES
EKG VENTRICULAR RATE: 86 BPM
EOSINOPHIL # BLD: 0.12 K/UL (ref 0.05–0.5)
EOSINOPHILS RELATIVE PERCENT: 1 % (ref 0–6)
ERYTHROCYTE [DISTWIDTH] IN BLOOD BY AUTOMATED COUNT: 11.9 % (ref 11.5–15)
GFR, ESTIMATED: >90 ML/MIN/1.73M2
GLUCOSE SERPL-MCNC: 88 MG/DL (ref 74–99)
HCT VFR BLD AUTO: 41.2 % (ref 37–54)
HGB BLD-MCNC: 14.5 G/DL (ref 12.5–16.5)
IMM GRANULOCYTES # BLD AUTO: 0.03 K/UL (ref 0–0.58)
IMM GRANULOCYTES NFR BLD: 0 % (ref 0–5)
LYMPHOCYTES NFR BLD: 1.4 K/UL (ref 1.5–4)
LYMPHOCYTES RELATIVE PERCENT: 15 % (ref 20–42)
MCH RBC QN AUTO: 31.3 PG (ref 26–35)
MCHC RBC AUTO-ENTMCNC: 35.2 G/DL (ref 32–34.5)
MCV RBC AUTO: 88.8 FL (ref 80–99.9)
MONOCYTES NFR BLD: 0.7 K/UL (ref 0.1–0.95)
MONOCYTES NFR BLD: 8 % (ref 2–12)
NEUTROPHILS NFR BLD: 75 % (ref 43–80)
NEUTS SEG NFR BLD: 7.04 K/UL (ref 1.8–7.3)
PLATELET # BLD AUTO: 228 K/UL (ref 130–450)
PMV BLD AUTO: 12 FL (ref 7–12)
POTASSIUM SERPL-SCNC: 3.8 MMOL/L (ref 3.5–5)
PROT SERPL-MCNC: 7.7 G/DL (ref 6.4–8.3)
RBC # BLD AUTO: 4.64 M/UL (ref 3.8–5.8)
SODIUM SERPL-SCNC: 136 MMOL/L (ref 132–146)
TROPONIN I SERPL HS-MCNC: <6 NG/L (ref 0–11)
WBC OTHER # BLD: 9.3 K/UL (ref 4.5–11.5)

## 2024-05-14 PROCEDURE — 93010 ELECTROCARDIOGRAM REPORT: CPT | Performed by: INTERNAL MEDICINE

## 2024-05-14 PROCEDURE — 80053 COMPREHEN METABOLIC PANEL: CPT

## 2024-05-14 PROCEDURE — 71046 X-RAY EXAM CHEST 2 VIEWS: CPT

## 2024-05-14 PROCEDURE — 6360000002 HC RX W HCPCS

## 2024-05-14 PROCEDURE — 6370000000 HC RX 637 (ALT 250 FOR IP)

## 2024-05-14 PROCEDURE — 84484 ASSAY OF TROPONIN QUANT: CPT

## 2024-05-14 PROCEDURE — 85025 COMPLETE CBC W/AUTO DIFF WBC: CPT

## 2024-05-14 RX ORDER — ASPIRIN 81 MG/1
324 TABLET, CHEWABLE ORAL ONCE
Status: COMPLETED | OUTPATIENT
Start: 2024-05-14 | End: 2024-05-14

## 2024-05-14 RX ORDER — ORPHENADRINE CITRATE 30 MG/ML
60 INJECTION INTRAMUSCULAR; INTRAVENOUS ONCE
Status: COMPLETED | OUTPATIENT
Start: 2024-05-14 | End: 2024-05-14

## 2024-05-14 RX ORDER — KETOROLAC TROMETHAMINE 15 MG/ML
15 INJECTION, SOLUTION INTRAMUSCULAR; INTRAVENOUS ONCE
Status: COMPLETED | OUTPATIENT
Start: 2024-05-14 | End: 2024-05-14

## 2024-05-14 RX ADMIN — ORPHENADRINE CITRATE 60 MG: 30 INJECTION, SOLUTION INTRAMUSCULAR; INTRAVENOUS at 02:35

## 2024-05-14 RX ADMIN — ASPIRIN 324 MG: 81 TABLET, CHEWABLE ORAL at 01:11

## 2024-05-14 RX ADMIN — KETOROLAC TROMETHAMINE 15 MG: 15 INJECTION, SOLUTION INTRAMUSCULAR; INTRAVENOUS at 01:49

## 2024-05-14 NOTE — ED PROVIDER NOTES
no chest wall tenderness, there is improvement of the patient's symptoms when applying pressure to his left chest.  No overlying skin changes or rashes appreciated.  GI:  Abdomen Soft, Non tender, Non distended.  No rebound, guarding, or rigidity. No pulsatile masses.  Musculoskeletal: Moves all extremities x 4. Warm and well perfused, no clubbing, no cyanosis, no edema. Capillary refill <3 seconds  Integument: skin warm and dry. No rashes.   Neurologic: GCS 15, no focal deficits, symmetric strength 5/5 in the upper and lower extremities bilaterally  Psychiatric: Normal Affect            DIAGNOSTIC RESULTS   LABS:    Labs Reviewed   CBC WITH AUTO DIFFERENTIAL - Abnormal; Notable for the following components:       Result Value    MCHC 35.2 (*)     Lymphocytes % 15 (*)     Lymphocytes Absolute 1.40 (*)     All other components within normal limits   COMPREHENSIVE METABOLIC PANEL   TROPONIN       As interpreted by me, the above displayed labs are abnormal. All other labs obtained during this visit were within normal range or not returned as of this dictation.      EKG Interpretation  Interpreted by emergency department physician, Yamilka Huertas MD        EKG demonstrates normal sinus rhythm with a ventricle rate of 86 bpm.  No acute ST-T wave changes (prior EKG on 4/4/2023.  QTc 402 MS.      RADIOLOGY:   Non-plain film images such as CT, Ultrasound and MRI are read by the radiologist. Plain radiographic images are visualized and preliminarily interpreted by the ED Provider with the below findings:    Chest x-ray shows no into pneumothorax or acute fracture    Interpretation per the Radiologist below, if available at the time of this note:    XR CHEST (2 VW)   Final Result   No acute process.           No results found.    No results found.    PROCEDURES   Unless otherwise noted below, none          CRITICAL CARE TIME (.cct)   N/A    PAST MEDICAL HISTORY/Chronic Conditions Affecting Care      has a past medical history

## 2024-05-14 NOTE — DISCHARGE INSTRUCTIONS
Follow up with your PCP to follow up on your symptoms. Return if worsening symptoms, severe chest pain, severe shortness of breath, or uncontrolled fevers

## 2024-05-15 ENCOUNTER — APPOINTMENT (OUTPATIENT)
Dept: CT IMAGING | Age: 34
End: 2024-05-15
Payer: COMMERCIAL

## 2024-05-15 ENCOUNTER — HOSPITAL ENCOUNTER (EMERGENCY)
Age: 34
Discharge: HOME | End: 2024-05-16
Payer: COMMERCIAL

## 2024-05-15 VITALS
TEMPERATURE: 98 F | DIASTOLIC BLOOD PRESSURE: 72 MMHG | SYSTOLIC BLOOD PRESSURE: 129 MMHG | HEART RATE: 72 BPM | HEIGHT: 72 IN | WEIGHT: 200 LBS | RESPIRATION RATE: 20 BRPM | BODY MASS INDEX: 27.09 KG/M2 | OXYGEN SATURATION: 97 %

## 2024-05-15 DIAGNOSIS — R07.9 CHEST PAIN, UNSPECIFIED TYPE: ICD-10-CM

## 2024-05-15 DIAGNOSIS — K04.7 DENTAL ABSCESS: Primary | ICD-10-CM

## 2024-05-15 LAB
ALBUMIN SERPL-MCNC: 4.6 G/DL (ref 3.5–5.2)
ALP SERPL-CCNC: 74 U/L (ref 40–129)
ALT SERPL-CCNC: 14 U/L (ref 0–40)
ANION GAP SERPL CALCULATED.3IONS-SCNC: 16 MMOL/L (ref 7–16)
AST SERPL-CCNC: 23 U/L (ref 0–39)
BASOPHILS # BLD: 0.06 K/UL (ref 0–0.2)
BASOPHILS NFR BLD: 1 % (ref 0–2)
BILIRUB SERPL-MCNC: 0.4 MG/DL (ref 0–1.2)
BUN SERPL-MCNC: 10 MG/DL (ref 6–20)
CALCIUM SERPL-MCNC: 9.6 MG/DL (ref 8.6–10.2)
CHLORIDE SERPL-SCNC: 99 MMOL/L (ref 98–107)
CO2 SERPL-SCNC: 21 MMOL/L (ref 22–29)
CREAT SERPL-MCNC: 0.9 MG/DL (ref 0.7–1.2)
EOSINOPHIL # BLD: 0.2 K/UL (ref 0.05–0.5)
EOSINOPHILS RELATIVE PERCENT: 2 % (ref 0–6)
ERYTHROCYTE [DISTWIDTH] IN BLOOD BY AUTOMATED COUNT: 11.9 % (ref 11.5–15)
GFR, ESTIMATED: >90 ML/MIN/1.73M2
GLUCOSE SERPL-MCNC: 110 MG/DL (ref 74–99)
HCT VFR BLD AUTO: 45.1 % (ref 37–54)
HGB BLD-MCNC: 15.3 G/DL (ref 12.5–16.5)
IMM GRANULOCYTES # BLD AUTO: <0.03 K/UL (ref 0–0.58)
IMM GRANULOCYTES NFR BLD: 0 % (ref 0–5)
LYMPHOCYTES NFR BLD: 1.43 K/UL (ref 1.5–4)
LYMPHOCYTES RELATIVE PERCENT: 16 % (ref 20–42)
MAGNESIUM SERPL-MCNC: 1.7 MG/DL (ref 1.6–2.6)
MCH RBC QN AUTO: 30.2 PG (ref 26–35)
MCHC RBC AUTO-ENTMCNC: 33.9 G/DL (ref 32–34.5)
MCV RBC AUTO: 89.1 FL (ref 80–99.9)
MONOCYTES NFR BLD: 0.93 K/UL (ref 0.1–0.95)
MONOCYTES NFR BLD: 10 % (ref 2–12)
NEUTROPHILS NFR BLD: 71 % (ref 43–80)
NEUTS SEG NFR BLD: 6.56 K/UL (ref 1.8–7.3)
PLATELET # BLD AUTO: 245 K/UL (ref 130–450)
PMV BLD AUTO: 12.2 FL (ref 7–12)
POTASSIUM SERPL-SCNC: 3.6 MMOL/L (ref 3.5–5)
PROT SERPL-MCNC: 7.9 G/DL (ref 6.4–8.3)
RBC # BLD AUTO: 5.06 M/UL (ref 3.8–5.8)
SODIUM SERPL-SCNC: 136 MMOL/L (ref 132–146)
TROPONIN I SERPL HS-MCNC: <6 NG/L (ref 0–11)
TROPONIN I SERPL HS-MCNC: <6 NG/L (ref 0–11)
WBC OTHER # BLD: 9.2 K/UL (ref 4.5–11.5)

## 2024-05-15 PROCEDURE — 2580000003 HC RX 258: Performed by: NURSE PRACTITIONER

## 2024-05-15 PROCEDURE — 93005 ELECTROCARDIOGRAM TRACING: CPT | Performed by: NURSE PRACTITIONER

## 2024-05-15 PROCEDURE — 85025 COMPLETE CBC W/AUTO DIFF WBC: CPT

## 2024-05-15 PROCEDURE — 2500000003 HC RX 250 WO HCPCS: Performed by: NURSE PRACTITIONER

## 2024-05-15 PROCEDURE — 99285 EMERGENCY DEPT VISIT HI MDM: CPT

## 2024-05-15 PROCEDURE — 84484 ASSAY OF TROPONIN QUANT: CPT

## 2024-05-15 PROCEDURE — 96374 THER/PROPH/DIAG INJ IV PUSH: CPT

## 2024-05-15 PROCEDURE — 74177 CT ABD & PELVIS W/CONTRAST: CPT

## 2024-05-15 PROCEDURE — A4216 STERILE WATER/SALINE, 10 ML: HCPCS | Performed by: NURSE PRACTITIONER

## 2024-05-15 PROCEDURE — 71275 CT ANGIOGRAPHY CHEST: CPT

## 2024-05-15 PROCEDURE — 6360000002 HC RX W HCPCS: Performed by: NURSE PRACTITIONER

## 2024-05-15 PROCEDURE — 6360000004 HC RX CONTRAST MEDICATION: Performed by: RADIOLOGY

## 2024-05-15 PROCEDURE — 83735 ASSAY OF MAGNESIUM: CPT

## 2024-05-15 PROCEDURE — 80053 COMPREHEN METABOLIC PANEL: CPT

## 2024-05-15 PROCEDURE — 6370000000 HC RX 637 (ALT 250 FOR IP): Performed by: NURSE PRACTITIONER

## 2024-05-15 PROCEDURE — 96375 TX/PRO/DX INJ NEW DRUG ADDON: CPT

## 2024-05-15 RX ORDER — KETOROLAC TROMETHAMINE 15 MG/ML
15 INJECTION, SOLUTION INTRAMUSCULAR; INTRAVENOUS ONCE
Status: COMPLETED | OUTPATIENT
Start: 2024-05-15 | End: 2024-05-15

## 2024-05-15 RX ORDER — AMOXICILLIN AND CLAVULANATE POTASSIUM 875; 125 MG/1; MG/1
1 TABLET, FILM COATED ORAL 2 TIMES DAILY
Qty: 20 TABLET | Refills: 0 | Status: SHIPPED | OUTPATIENT
Start: 2024-05-15 | End: 2024-05-25

## 2024-05-15 RX ORDER — ONDANSETRON 2 MG/ML
4 INJECTION INTRAMUSCULAR; INTRAVENOUS ONCE
Status: COMPLETED | OUTPATIENT
Start: 2024-05-15 | End: 2024-05-15

## 2024-05-15 RX ORDER — AMOXICILLIN AND CLAVULANATE POTASSIUM 875; 125 MG/1; MG/1
1 TABLET, FILM COATED ORAL ONCE
Status: COMPLETED | OUTPATIENT
Start: 2024-05-15 | End: 2024-05-16

## 2024-05-15 RX ORDER — IBUPROFEN 800 MG/1
800 TABLET ORAL
Qty: 30 TABLET | Refills: 0 | Status: SHIPPED | OUTPATIENT
Start: 2024-05-15 | End: 2024-05-25

## 2024-05-15 RX ADMIN — FAMOTIDINE 20 MG: 10 INJECTION, SOLUTION INTRAVENOUS at 22:49

## 2024-05-15 RX ADMIN — LIDOCAINE HYDROCHLORIDE: 20 SOLUTION ORAL at 22:48

## 2024-05-15 RX ADMIN — ONDANSETRON 4 MG: 2 INJECTION INTRAMUSCULAR; INTRAVENOUS at 19:18

## 2024-05-15 RX ADMIN — KETOROLAC TROMETHAMINE 15 MG: 15 INJECTION, SOLUTION INTRAMUSCULAR; INTRAVENOUS at 19:18

## 2024-05-15 RX ADMIN — IOPAMIDOL 70 ML: 755 INJECTION, SOLUTION INTRAVENOUS at 19:54

## 2024-05-15 ASSESSMENT — PAIN DESCRIPTION - DESCRIPTORS: DESCRIPTORS: DISCOMFORT

## 2024-05-15 ASSESSMENT — PAIN DESCRIPTION - ORIENTATION: ORIENTATION: LEFT;MID

## 2024-05-15 ASSESSMENT — PAIN DESCRIPTION - LOCATION
LOCATION: CHEST
LOCATION: ABDOMEN;JAW

## 2024-05-15 ASSESSMENT — LIFESTYLE VARIABLES
HOW OFTEN DO YOU HAVE A DRINK CONTAINING ALCOHOL: 2-4 TIMES A MONTH
HOW MANY STANDARD DRINKS CONTAINING ALCOHOL DO YOU HAVE ON A TYPICAL DAY: 1 OR 2

## 2024-05-15 ASSESSMENT — PAIN - FUNCTIONAL ASSESSMENT: PAIN_FUNCTIONAL_ASSESSMENT: 0-10

## 2024-05-15 ASSESSMENT — PAIN SCALES - GENERAL
PAINLEVEL_OUTOF10: 6
PAINLEVEL_OUTOF10: 6

## 2024-05-15 ASSESSMENT — HEART SCORE: ECG: NORMAL

## 2024-05-15 NOTE — ED PROVIDER NOTES
Independent [facility] Visit.  HPI:  5/15/24, Time: 7:12 PM EDT         Roel Madrigal is a 33 y.o. male presenting to the ED for evaluation of an infection in his left upper tooth, chest pain which she describes as a burning sensation in his substernal region and to the right side of his upper chest, pleuritic chest pain, shortness of breath, upper abdominal pain and nausea with vomiting.  He also reports he had a fever.  Reports it was 102 and he did take Tylenol which did alleviate his fever.  States he was seen and evaluated for some of the symptoms in the last 48 hours.  He states he did not have the abdominal pain then nausea vomiting or fever when he was evaluated nor did he have the dental problem merrily was complaining of some chest pain with shortness of breath.    ROS:   Pertinent positives and negatives are stated within HPI, all other systems reviewed and are negative.  --------------------------------------------- PAST HISTORY ---------------------------------------------  Past Medical History:  has a past medical history of Crohn disease (HCC).    Past Surgical History:  has no past surgical history on file.    Social History:  reports that he has quit smoking. His smoking use included cigarettes. He has never used smokeless tobacco. He reports current alcohol use. He reports current drug use. Drug: Marijuana (Weed).    Family History: family history is not on file.     The patient’s home medications have been reviewed.    Allergies: Patient has no known allergies.    ---------------------------------------------------PHYSICAL EXAM--------------------------------------    Constitutional/General: Alert and oriented x3, well appearing, non toxic in NAD  Head: Normocephalic and atraumatic  Eyes: PERRL, EOMI  Mouth: Oropharynx clear, handling secretions, no trismus  Neck: Supple, full ROM, non tender to palpation in the midline, no stridor, no crepitus, no meningeal signs  Pulmonary: Lungs clear to

## 2024-05-16 LAB
EKG ATRIAL RATE: 90 BPM
EKG P AXIS: 80 DEGREES
EKG P-R INTERVAL: 150 MS
EKG Q-T INTERVAL: 348 MS
EKG QRS DURATION: 84 MS
EKG QTC CALCULATION (BAZETT): 425 MS
EKG R AXIS: 68 DEGREES
EKG T AXIS: 52 DEGREES
EKG VENTRICULAR RATE: 90 BPM

## 2024-05-16 PROCEDURE — 6370000000 HC RX 637 (ALT 250 FOR IP): Performed by: NURSE PRACTITIONER

## 2024-05-16 PROCEDURE — 93010 ELECTROCARDIOGRAM REPORT: CPT | Performed by: INTERNAL MEDICINE

## 2024-05-16 RX ADMIN — AMOXICILLIN AND CLAVULANATE POTASSIUM 1 TABLET: 875; 125 TABLET, FILM COATED ORAL at 00:02

## 2024-08-19 ENCOUNTER — HOSPITAL ENCOUNTER (EMERGENCY)
Age: 34
Discharge: HOME | End: 2024-08-19
Payer: COMMERCIAL

## 2024-08-19 ENCOUNTER — APPOINTMENT (OUTPATIENT)
Dept: GENERAL RADIOLOGY | Age: 34
End: 2024-08-19
Payer: COMMERCIAL

## 2024-08-19 VITALS
HEART RATE: 63 BPM | OXYGEN SATURATION: 100 % | DIASTOLIC BLOOD PRESSURE: 90 MMHG | TEMPERATURE: 97.6 F | RESPIRATION RATE: 20 BRPM | SYSTOLIC BLOOD PRESSURE: 129 MMHG

## 2024-08-19 DIAGNOSIS — M20.012 MALLET DEFORMITY OF LEFT MIDDLE FINGER: Primary | ICD-10-CM

## 2024-08-19 PROCEDURE — 99283 EMERGENCY DEPT VISIT LOW MDM: CPT

## 2024-08-19 PROCEDURE — 6370000000 HC RX 637 (ALT 250 FOR IP): Performed by: PHYSICIAN ASSISTANT

## 2024-08-19 PROCEDURE — 73130 X-RAY EXAM OF HAND: CPT

## 2024-08-19 RX ORDER — IBUPROFEN 800 MG/1
800 TABLET ORAL ONCE
Status: COMPLETED | OUTPATIENT
Start: 2024-08-19 | End: 2024-08-19

## 2024-08-19 RX ORDER — IBUPROFEN 800 MG/1
800 TABLET ORAL EVERY 8 HOURS PRN
Qty: 15 TABLET | Refills: 0 | Status: SHIPPED | OUTPATIENT
Start: 2024-08-19 | End: 2024-08-24

## 2024-08-19 RX ADMIN — IBUPROFEN 800 MG: 800 TABLET, FILM COATED ORAL at 16:01

## 2024-08-19 ASSESSMENT — PAIN DESCRIPTION - LOCATION: LOCATION: HAND

## 2024-08-19 ASSESSMENT — PAIN SCALES - GENERAL: PAINLEVEL_OUTOF10: 6

## 2024-08-19 ASSESSMENT — PAIN DESCRIPTION - ORIENTATION: ORIENTATION: LEFT

## 2024-08-19 ASSESSMENT — PAIN DESCRIPTION - DESCRIPTORS: DESCRIPTORS: TIGHTNESS

## 2024-08-19 NOTE — ED PROVIDER NOTES
precautions and discharge instructions. The patient  expressed understanding. Vitals were stable and they were in no distress at discharge.        Assessment      1. Mallet deformity of left middle finger      Plan   Discharged home.  Patient condition is good    New Medications     New Prescriptions    IBUPROFEN (ADVIL;MOTRIN) 800 MG TABLET    Take 1 tablet by mouth every 8 hours as needed for Pain     Electronically signed by RADHA Tirado   DD: 8/19/24  **This report was transcribed using voice recognition software. Every effort was made to ensure accuracy; however, inadvertent computerized transcription errors may be present.  END OF ED PROVIDER NOTE      Josie Wolf PA  08/19/24 [id number]

## 2025-01-30 ENCOUNTER — HOSPITAL ENCOUNTER (EMERGENCY)
Age: 35
Discharge: HOME | End: 2025-01-30
Attending: STUDENT IN AN ORGANIZED HEALTH CARE EDUCATION/TRAINING PROGRAM
Payer: COMMERCIAL

## 2025-01-30 ENCOUNTER — APPOINTMENT (OUTPATIENT)
Dept: GENERAL RADIOLOGY | Age: 35
End: 2025-01-30
Payer: COMMERCIAL

## 2025-01-30 VITALS
BODY MASS INDEX: 27.12 KG/M2 | WEIGHT: 200 LBS | OXYGEN SATURATION: 97 % | SYSTOLIC BLOOD PRESSURE: 136 MMHG | DIASTOLIC BLOOD PRESSURE: 85 MMHG | RESPIRATION RATE: 18 BRPM | HEART RATE: 66 BPM | TEMPERATURE: 98.2 F

## 2025-01-30 DIAGNOSIS — J06.9 UPPER RESPIRATORY TRACT INFECTION, UNSPECIFIED TYPE: Primary | ICD-10-CM

## 2025-01-30 LAB
ALBUMIN SERPL-MCNC: 4.6 G/DL (ref 3.5–5.2)
ALP SERPL-CCNC: 71 U/L (ref 40–129)
ALT SERPL-CCNC: 9 U/L (ref 0–40)
ANION GAP SERPL CALCULATED.3IONS-SCNC: 13 MMOL/L (ref 7–16)
AST SERPL-CCNC: 12 U/L (ref 0–39)
BASOPHILS # BLD: 0.04 K/UL (ref 0–0.2)
BASOPHILS NFR BLD: 1 % (ref 0–2)
BILIRUB SERPL-MCNC: 0.3 MG/DL (ref 0–1.2)
BUN SERPL-MCNC: 12 MG/DL (ref 6–20)
CALCIUM SERPL-MCNC: 10 MG/DL (ref 8.6–10.2)
CHLORIDE SERPL-SCNC: 101 MMOL/L (ref 98–107)
CO2 SERPL-SCNC: 25 MMOL/L (ref 22–29)
CREAT SERPL-MCNC: 0.9 MG/DL (ref 0.7–1.2)
EOSINOPHIL # BLD: 0.21 K/UL (ref 0.05–0.5)
EOSINOPHILS RELATIVE PERCENT: 3 % (ref 0–6)
ERYTHROCYTE [DISTWIDTH] IN BLOOD BY AUTOMATED COUNT: 11.6 % (ref 11.5–15)
FLUAV RNA RESP QL NAA+PROBE: NOT DETECTED
FLUBV RNA RESP QL NAA+PROBE: NOT DETECTED
GFR, ESTIMATED: >90 ML/MIN/1.73M2
GLUCOSE SERPL-MCNC: 105 MG/DL (ref 74–99)
HCT VFR BLD AUTO: 43.6 % (ref 37–54)
HETEROPH AB BLD QL IA: NEGATIVE
HGB BLD-MCNC: 15.4 G/DL (ref 12.5–16.5)
IMM GRANULOCYTES # BLD AUTO: <0.03 K/UL (ref 0–0.58)
IMM GRANULOCYTES NFR BLD: 0 % (ref 0–5)
LYMPHOCYTES NFR BLD: 2.37 K/UL (ref 1.5–4)
LYMPHOCYTES RELATIVE PERCENT: 38 % (ref 20–42)
MCH RBC QN AUTO: 30.9 PG (ref 26–35)
MCHC RBC AUTO-ENTMCNC: 35.3 G/DL (ref 32–34.5)
MCV RBC AUTO: 87.4 FL (ref 80–99.9)
MONOCYTES NFR BLD: 0.49 K/UL (ref 0.1–0.95)
MONOCYTES NFR BLD: 8 % (ref 2–12)
NEUTROPHILS NFR BLD: 51 % (ref 43–80)
NEUTS SEG NFR BLD: 3.21 K/UL (ref 1.8–7.3)
PLATELET # BLD AUTO: 266 K/UL (ref 130–450)
PMV BLD AUTO: 11.3 FL (ref 7–12)
POTASSIUM SERPL-SCNC: 4 MMOL/L (ref 3.5–5)
PROT SERPL-MCNC: 8.1 G/DL (ref 6.4–8.3)
RBC # BLD AUTO: 4.99 M/UL (ref 3.8–5.8)
SARS-COV-2 RNA RESP QL NAA+PROBE: NOT DETECTED
SODIUM SERPL-SCNC: 139 MMOL/L (ref 132–146)
SOURCE: NORMAL
SPECIMEN DESCRIPTION: NORMAL
WBC OTHER # BLD: 6.3 K/UL (ref 4.5–11.5)

## 2025-01-30 PROCEDURE — 80053 COMPREHEN METABOLIC PANEL: CPT

## 2025-01-30 PROCEDURE — 87636 SARSCOV2 & INF A&B AMP PRB: CPT

## 2025-01-30 PROCEDURE — 6360000002 HC RX W HCPCS: Performed by: STUDENT IN AN ORGANIZED HEALTH CARE EDUCATION/TRAINING PROGRAM

## 2025-01-30 PROCEDURE — 6370000000 HC RX 637 (ALT 250 FOR IP): Performed by: STUDENT IN AN ORGANIZED HEALTH CARE EDUCATION/TRAINING PROGRAM

## 2025-01-30 PROCEDURE — 99284 EMERGENCY DEPT VISIT MOD MDM: CPT

## 2025-01-30 PROCEDURE — 86308 HETEROPHILE ANTIBODY SCREEN: CPT

## 2025-01-30 PROCEDURE — 2580000003 HC RX 258: Performed by: STUDENT IN AN ORGANIZED HEALTH CARE EDUCATION/TRAINING PROGRAM

## 2025-01-30 PROCEDURE — 96374 THER/PROPH/DIAG INJ IV PUSH: CPT

## 2025-01-30 PROCEDURE — 71046 X-RAY EXAM CHEST 2 VIEWS: CPT

## 2025-01-30 PROCEDURE — 85025 COMPLETE CBC W/AUTO DIFF WBC: CPT

## 2025-01-30 RX ORDER — 0.9 % SODIUM CHLORIDE 0.9 %
1000 INTRAVENOUS SOLUTION INTRAVENOUS ONCE
Status: COMPLETED | OUTPATIENT
Start: 2025-01-30 | End: 2025-01-30

## 2025-01-30 RX ORDER — KETOROLAC TROMETHAMINE 30 MG/ML
15 INJECTION, SOLUTION INTRAMUSCULAR; INTRAVENOUS ONCE
Status: COMPLETED | OUTPATIENT
Start: 2025-01-30 | End: 2025-01-30

## 2025-01-30 RX ORDER — AZITHROMYCIN 250 MG/1
TABLET, FILM COATED ORAL
Qty: 6 TABLET | Refills: 0 | Status: SHIPPED | OUTPATIENT
Start: 2025-01-30 | End: 2025-02-09

## 2025-01-30 RX ORDER — GUAIFENESIN 600 MG/1
600 TABLET, EXTENDED RELEASE ORAL 2 TIMES DAILY
Qty: 30 TABLET | Refills: 0 | Status: SHIPPED | OUTPATIENT
Start: 2025-01-30 | End: 2025-02-14

## 2025-01-30 RX ORDER — IBUPROFEN 800 MG/1
800 TABLET, FILM COATED ORAL 3 TIMES DAILY PRN
Qty: 21 TABLET | Refills: 0 | Status: SHIPPED | OUTPATIENT
Start: 2025-01-30 | End: 2025-02-09

## 2025-01-30 RX ORDER — AZITHROMYCIN 250 MG/1
500 TABLET, FILM COATED ORAL ONCE
Status: COMPLETED | OUTPATIENT
Start: 2025-01-30 | End: 2025-01-30

## 2025-01-30 RX ADMIN — SODIUM CHLORIDE 1000 ML: 9 INJECTION, SOLUTION INTRAVENOUS at 16:54

## 2025-01-30 RX ADMIN — AZITHROMYCIN DIHYDRATE 500 MG: 250 TABLET ORAL at 17:40

## 2025-01-30 RX ADMIN — KETOROLAC TROMETHAMINE 15 MG: 30 INJECTION, SOLUTION INTRAMUSCULAR at 16:54

## 2025-01-30 ASSESSMENT — PAIN DESCRIPTION - DESCRIPTORS
DESCRIPTORS: DISCOMFORT;TIGHTNESS
DESCRIPTORS: ACHING;DISCOMFORT;SORE

## 2025-01-30 ASSESSMENT — PAIN DESCRIPTION - PAIN TYPE: TYPE: ACUTE PAIN

## 2025-01-30 ASSESSMENT — PAIN SCALES - GENERAL
PAINLEVEL_OUTOF10: 8
PAINLEVEL_OUTOF10: 7

## 2025-01-30 ASSESSMENT — PAIN DESCRIPTION - LOCATION
LOCATION: NECK
LOCATION: NECK

## 2025-01-30 ASSESSMENT — PAIN - FUNCTIONAL ASSESSMENT: PAIN_FUNCTIONAL_ASSESSMENT: 0-10

## 2025-01-30 ASSESSMENT — PAIN DESCRIPTION - FREQUENCY: FREQUENCY: CONTINUOUS

## 2025-01-30 ASSESSMENT — PAIN DESCRIPTION - ONSET: ONSET: SUDDEN

## 2025-02-03 NOTE — ED PROVIDER NOTES
[facility] EMERGENCY DEPARTMENT  EMERGENCY DEPARTMENT ENCOUNTER      Pt Name: Roel Madrigal  MRN: [id number]  Birthdate 1990  Date of evaluation: 1/30/2025  Provider: Miguel Joaquin DO  PCP: Babs Armas DO    CHIEF COMPLAINT       Chief Complaint   Patient presents with    Influenza     Fever, chills, body aches, chest congestion. X 7 days.  +Neck pain.         HISTORY OF PRESENT ILLNESS: 1 or more Elements   History From: Patient  Limitations to history : None    Roel Madrigal is a 34 y.o. male Past medical history of Crohn disease as well as cervical radiculopathy.  Patient presents with chief complaint of fevers as well as generalized bodyaches chest congestion and some neck discomfort for past 7 days.  Patient notes that he has had a cough pressure with some whitish sputum as well as some generalized bodyaches with some subjective fevers and chills.  Patient also endorses some neck pain which has been worsened with coughing.  He denies any falls or trauma.  Symptoms have been moderate in severity constant onset worsened with coughing denies any relieving factors.  Patient denies any nausea, vomiting, Mitesh pain, constipation or diarrhea.    Nursing Notes were all reviewed and agreed with or any disagreements were addressed in the HPI.    REVIEW OF SYSTEMS :    Positives and Pertinent negatives as per HPI.     PAST MEDICAL HISTORY/Chronic Conditions Affecting Care    has a past medical history of Cervical herniated disc, Crohn disease (HCC), and Thoracic disc herniation.     SURGICAL HISTORY   History reviewed. No pertinent surgical history.    CURRENTMEDICATIONS       Discharge Medication List as of 1/30/2025  5:41 PM          ALLERGIES     Patient has no known allergies.    FAMILYHISTORY     History reviewed. No pertinent family history.     SOCIAL HISTORY       Social History     Tobacco Use    Smoking status: Former     Current packs/day: 1.00     Types: Cigarettes

## 2025-03-12 ENCOUNTER — APPOINTMENT (OUTPATIENT)
Dept: GENERAL RADIOLOGY | Age: 35
End: 2025-03-12
Payer: COMMERCIAL

## 2025-03-12 ENCOUNTER — APPOINTMENT (OUTPATIENT)
Dept: ULTRASOUND IMAGING | Age: 35
End: 2025-03-12
Payer: COMMERCIAL

## 2025-03-12 ENCOUNTER — HOSPITAL ENCOUNTER (EMERGENCY)
Age: 35
Discharge: HOME | End: 2025-03-12
Attending: EMERGENCY MEDICINE
Payer: COMMERCIAL

## 2025-03-12 VITALS
OXYGEN SATURATION: 96 % | RESPIRATION RATE: 17 BRPM | DIASTOLIC BLOOD PRESSURE: 65 MMHG | SYSTOLIC BLOOD PRESSURE: 112 MMHG | HEART RATE: 59 BPM | TEMPERATURE: 98.2 F

## 2025-03-12 DIAGNOSIS — R07.9 CHEST PAIN, UNSPECIFIED TYPE: Primary | ICD-10-CM

## 2025-03-12 LAB
ALBUMIN SERPL-MCNC: 4.8 G/DL (ref 3.5–5.2)
ALP SERPL-CCNC: 79 U/L (ref 40–129)
ALT SERPL-CCNC: 16 U/L (ref 0–40)
ANION GAP SERPL CALCULATED.3IONS-SCNC: 15 MMOL/L (ref 7–16)
AST SERPL-CCNC: 17 U/L (ref 0–39)
BASOPHILS # BLD: 0.08 K/UL (ref 0–0.2)
BASOPHILS NFR BLD: 1 % (ref 0–2)
BILIRUB DIRECT SERPL-MCNC: <0.2 MG/DL (ref 0–0.3)
BILIRUB INDIRECT SERPL-MCNC: NORMAL MG/DL (ref 0–1)
BILIRUB SERPL-MCNC: 0.3 MG/DL (ref 0–1.2)
BUN SERPL-MCNC: 11 MG/DL (ref 6–20)
CALCIUM SERPL-MCNC: 9.9 MG/DL (ref 8.6–10.2)
CHLORIDE SERPL-SCNC: 103 MMOL/L (ref 98–107)
CO2 SERPL-SCNC: 20 MMOL/L (ref 22–29)
CREAT SERPL-MCNC: 0.8 MG/DL (ref 0.7–1.2)
D-DIMER QUANTITATIVE: <200 NG/ML DDU (ref 0–230)
EOSINOPHIL # BLD: 0.39 K/UL (ref 0.05–0.5)
EOSINOPHILS RELATIVE PERCENT: 4 % (ref 0–6)
ERYTHROCYTE [DISTWIDTH] IN BLOOD BY AUTOMATED COUNT: 12 % (ref 11.5–15)
GFR, ESTIMATED: >90 ML/MIN/1.73M2
GLUCOSE SERPL-MCNC: 110 MG/DL (ref 74–99)
HCT VFR BLD AUTO: 40.5 % (ref 37–54)
HGB BLD-MCNC: 13.9 G/DL (ref 12.5–16.5)
IMM GRANULOCYTES # BLD AUTO: 0.04 K/UL (ref 0–0.58)
IMM GRANULOCYTES NFR BLD: 0 % (ref 0–5)
LIPASE SERPL-CCNC: 22 U/L (ref 13–60)
LYMPHOCYTES NFR BLD: 1.95 K/UL (ref 1.5–4)
LYMPHOCYTES RELATIVE PERCENT: 21 % (ref 20–42)
MCH RBC QN AUTO: 30.8 PG (ref 26–35)
MCHC RBC AUTO-ENTMCNC: 34.3 G/DL (ref 32–34.5)
MCV RBC AUTO: 89.8 FL (ref 80–99.9)
MONOCYTES NFR BLD: 0.71 K/UL (ref 0.1–0.95)
MONOCYTES NFR BLD: 8 % (ref 2–12)
NEUTROPHILS NFR BLD: 67 % (ref 43–80)
NEUTS SEG NFR BLD: 6.27 K/UL (ref 1.8–7.3)
PLATELET # BLD AUTO: 256 K/UL (ref 130–450)
PMV BLD AUTO: 11.8 FL (ref 7–12)
POTASSIUM SERPL-SCNC: 4.5 MMOL/L (ref 3.5–5)
PROT SERPL-MCNC: 7.8 G/DL (ref 6.4–8.3)
RBC # BLD AUTO: 4.51 M/UL (ref 3.8–5.8)
SODIUM SERPL-SCNC: 138 MMOL/L (ref 132–146)
TROPONIN I SERPL HS-MCNC: 7 NG/L (ref 0–11)
TROPONIN I SERPL HS-MCNC: <6 NG/L (ref 0–11)
WBC OTHER # BLD: 9.4 K/UL (ref 4.5–11.5)

## 2025-03-12 PROCEDURE — 83690 ASSAY OF LIPASE: CPT

## 2025-03-12 PROCEDURE — 6360000002 HC RX W HCPCS: Performed by: EMERGENCY MEDICINE

## 2025-03-12 PROCEDURE — 93005 ELECTROCARDIOGRAM TRACING: CPT

## 2025-03-12 PROCEDURE — 99285 EMERGENCY DEPT VISIT HI MDM: CPT

## 2025-03-12 PROCEDURE — 84484 ASSAY OF TROPONIN QUANT: CPT

## 2025-03-12 PROCEDURE — 82248 BILIRUBIN DIRECT: CPT

## 2025-03-12 PROCEDURE — 71045 X-RAY EXAM CHEST 1 VIEW: CPT

## 2025-03-12 PROCEDURE — 76705 ECHO EXAM OF ABDOMEN: CPT

## 2025-03-12 PROCEDURE — 80053 COMPREHEN METABOLIC PANEL: CPT

## 2025-03-12 PROCEDURE — 6370000000 HC RX 637 (ALT 250 FOR IP): Performed by: EMERGENCY MEDICINE

## 2025-03-12 PROCEDURE — 96374 THER/PROPH/DIAG INJ IV PUSH: CPT

## 2025-03-12 PROCEDURE — 6370000000 HC RX 637 (ALT 250 FOR IP)

## 2025-03-12 PROCEDURE — 85379 FIBRIN DEGRADATION QUANT: CPT

## 2025-03-12 PROCEDURE — 96375 TX/PRO/DX INJ NEW DRUG ADDON: CPT

## 2025-03-12 PROCEDURE — 85025 COMPLETE CBC W/AUTO DIFF WBC: CPT

## 2025-03-12 PROCEDURE — 6360000002 HC RX W HCPCS

## 2025-03-12 RX ORDER — KETOROLAC TROMETHAMINE 30 MG/ML
15 INJECTION, SOLUTION INTRAMUSCULAR; INTRAVENOUS ONCE
Status: DISCONTINUED | OUTPATIENT
Start: 2025-03-12 | End: 2025-03-12

## 2025-03-12 RX ORDER — PANTOPRAZOLE SODIUM 40 MG/10ML
40 INJECTION, POWDER, LYOPHILIZED, FOR SOLUTION INTRAVENOUS ONCE
Status: COMPLETED | OUTPATIENT
Start: 2025-03-12 | End: 2025-03-12

## 2025-03-12 RX ORDER — METOCLOPRAMIDE HYDROCHLORIDE 5 MG/ML
10 INJECTION INTRAMUSCULAR; INTRAVENOUS ONCE
Status: COMPLETED | OUTPATIENT
Start: 2025-03-12 | End: 2025-03-12

## 2025-03-12 RX ORDER — IBUPROFEN 400 MG/1
400 TABLET, FILM COATED ORAL ONCE
Status: COMPLETED | OUTPATIENT
Start: 2025-03-12 | End: 2025-03-12

## 2025-03-12 RX ORDER — IBUPROFEN 800 MG/1
800 TABLET, FILM COATED ORAL EVERY 8 HOURS PRN
Qty: 120 TABLET | Refills: 3 | Status: SHIPPED | OUTPATIENT
Start: 2025-03-12

## 2025-03-12 RX ORDER — OXYCODONE AND ACETAMINOPHEN 5; 325 MG/1; MG/1
2 TABLET ORAL ONCE
Refills: 0 | Status: DISCONTINUED | OUTPATIENT
Start: 2025-03-12 | End: 2025-03-12

## 2025-03-12 RX ADMIN — LIDOCAINE HYDROCHLORIDE: 20 SOLUTION ORAL at 11:06

## 2025-03-12 RX ADMIN — PANTOPRAZOLE SODIUM 40 MG: 40 INJECTION, POWDER, FOR SOLUTION INTRAVENOUS at 11:06

## 2025-03-12 RX ADMIN — IBUPROFEN 400 MG: 400 TABLET ORAL at 12:50

## 2025-03-12 RX ADMIN — METOCLOPRAMIDE 10 MG: 5 INJECTION, SOLUTION INTRAMUSCULAR; INTRAVENOUS at 12:51

## 2025-03-12 NOTE — ED PROVIDER NOTES
HPI:  3/12/25,   Time: 10:32 AM EDT       Roel Madrigal is a 34 y.o. male presenting to the ED for cp, beginning 5 weeks ago.  The complaint has been intermittent, mild in severity, and worsened by deep breath.  Sharp in nature.  Seen also time for same.  Did have a URI couple weeks ago.  Recent travel to [place].  No fevers chills or sweats.  No leg swelling.  No nausea vomiting diarrhea.    Review of Systems:   Pertinent positives and negatives are stated within HPI, all other systems reviewed and are negative.          --------------------------------------------- PAST HISTORY ---------------------------------------------  Past Medical History:  has a past medical history of Cervical herniated disc, Crohn disease (HCC), and Thoracic disc herniation.    Past Surgical History:  has no past surgical history on file.    Social History:  reports that he has quit smoking. His smoking use included cigarettes. He has never used smokeless tobacco. He reports current alcohol use. He reports current drug use. Drug: Marijuana (Weed).    Family History: family history is not on file.     The patient’s home medications have been reviewed.    Allergies: Patient has no known allergies.        ---------------------------------------------------PHYSICAL EXAM--------------------------------------    Constitutional/General: Alert and oriented x3, well appearing, non toxic in NAD  Head: Normocephalic and atraumatic  Eyes: PERRL, EOMI, conjunctive normal, sclera non icteric  Mouth: Oropharynx clear, handling secretions, no trismus, no asymmetry of the posterior oropharynx or uvular edema  Neck: Supple, full ROM, non tender to palpation in the midline, no stridor, no crepitus, no meningeal signs  Respiratory: . Not in respiratory distress  Cardiovascular:  Regular rate. Regular rhythm. 2+ distal pulses  Chest: No chest wall tenderness  GI:  Abdomen Soft, Non tender, Non distended. .   Musculoskeletal: Moves all extremities x 4. 
(REGLAN) injection 10 mg (10 mg IntraVENous Given 3/12/25 1251)       Labs reviewed and interpreted by me:  Results for orders placed or performed during the hospital encounter of 03/12/25   BMP   Result Value Ref Range    Sodium 138 132 - 146 mmol/L    Potassium 4.5 3.5 - 5.0 mmol/L    Chloride 103 98 - 107 mmol/L    CO2 20 (L) 22 - 29 mmol/L    Anion Gap 15 7 - 16 mmol/L    Glucose 110 (H) 74 - 99 mg/dL    BUN 11 6 - 20 mg/dL    Creatinine 0.8 0.70 - 1.20 mg/dL    Est, Glom Filt Rate >90 >60 mL/min/1.73m2    Calcium 9.9 8.6 - 10.2 mg/dL   CBC with Auto Differential   Result Value Ref Range    WBC 9.4 4.5 - 11.5 k/uL    RBC 4.51 3.80 - 5.80 m/uL    Hemoglobin 13.9 12.5 - 16.5 g/dL    Hematocrit 40.5 37.0 - 54.0 %    MCV 89.8 80.0 - 99.9 fL    MCH 30.8 26.0 - 35.0 pg    MCHC 34.3 32.0 - 34.5 g/dL    RDW 12.0 11.5 - 15.0 %    Platelets 256 130 - 450 k/uL    MPV 11.8 7.0 - 12.0 fL    Neutrophils % 67 43.0 - 80.0 %    Lymphocytes % 21 20.0 - 42.0 %    Monocytes % 8 2.0 - 12.0 %    Eosinophils % 4 0 - 6 %    Basophils % 1 0.0 - 2.0 %    Immature Granulocytes % 0 0.0 - 5.0 %    Neutrophils Absolute 6.27 1.80 - 7.30 k/uL    Lymphocytes Absolute 1.95 1.50 - 4.00 k/uL    Monocytes Absolute 0.71 0.10 - 0.95 k/uL    Eosinophils Absolute 0.39 0.05 - 0.50 k/uL    Basophils Absolute 0.08 0.00 - 0.20 k/uL    Immature Granulocytes Absolute 0.04 0.00 - 0.58 k/uL   Hepatic Function Panel   Result Value Ref Range    Albumin 4.8 3.5 - 5.2 g/dL    Alkaline Phosphatase 79 40 - 129 U/L    ALT 16 0 - 40 U/L    AST 17 0 - 39 U/L    Total Bilirubin 0.3 0.0 - 1.2 mg/dL    Bilirubin, Direct <0.2 0.0 - 0.3 mg/dL    Bilirubin, Indirect Can not be calculated 0.0 - 1.0 mg/dL    Total Protein 7.8 6.4 - 8.3 g/dL   Lipase   Result Value Ref Range    Lipase 22 13 - 60 U/L   Troponin   Result Value Ref Range    Troponin, High Sensitivity 7 0 - 11 ng/L   D-Dimer, Quantitative   Result Value Ref Range    D-Dimer, Quant <200 0 - 230 ng/mL DDU   Troponin

## 2025-03-12 NOTE — DISCHARGE INSTRUCTIONS
Please return to ED if symptoms worsen, persist, or occur.  Please follow-up with PCP.  Please take your medications as prescribed.    US GALLBLADDER RUQ   Final Result   Unremarkable right upper quadrant ultrasound.         XR CHEST PORTABLE   Final Result   No acute process.      No significant interval change.

## 2025-03-13 LAB
EKG ATRIAL RATE: 56 BPM
EKG P AXIS: 61 DEGREES
EKG P-R INTERVAL: 148 MS
EKG Q-T INTERVAL: 404 MS
EKG QRS DURATION: 96 MS
EKG QTC CALCULATION (BAZETT): 389 MS
EKG R AXIS: 79 DEGREES
EKG T AXIS: 50 DEGREES
EKG VENTRICULAR RATE: 56 BPM

## 2025-03-13 PROCEDURE — 93010 ELECTROCARDIOGRAM REPORT: CPT | Performed by: INTERNAL MEDICINE

## 2025-04-10 ENCOUNTER — HOSPITAL ENCOUNTER (EMERGENCY)
Age: 35
Discharge: HOME | End: 2025-04-10
Attending: EMERGENCY MEDICINE
Payer: COMMERCIAL

## 2025-04-10 VITALS
OXYGEN SATURATION: 97 % | RESPIRATION RATE: 18 BRPM | SYSTOLIC BLOOD PRESSURE: 118 MMHG | DIASTOLIC BLOOD PRESSURE: 86 MMHG | TEMPERATURE: 97.8 F | HEART RATE: 80 BPM

## 2025-04-10 DIAGNOSIS — R31.9 HEMATURIA, UNSPECIFIED TYPE: ICD-10-CM

## 2025-04-10 DIAGNOSIS — E86.0 DEHYDRATION: ICD-10-CM

## 2025-04-10 DIAGNOSIS — R19.7 DIARRHEA, UNSPECIFIED TYPE: ICD-10-CM

## 2025-04-10 DIAGNOSIS — R55 SYNCOPE, UNSPECIFIED SYNCOPE TYPE: ICD-10-CM

## 2025-04-10 DIAGNOSIS — R11.2 NAUSEA AND VOMITING, UNSPECIFIED VOMITING TYPE: Primary | ICD-10-CM

## 2025-04-10 LAB
ALBUMIN SERPL-MCNC: 5.1 G/DL (ref 3.5–5.2)
ALP SERPL-CCNC: 83 U/L (ref 40–129)
ALT SERPL-CCNC: <5 U/L (ref 0–40)
ANION GAP SERPL CALCULATED.3IONS-SCNC: 16 MMOL/L (ref 7–16)
AST SERPL-CCNC: 22 U/L (ref 0–39)
BACTERIA URNS QL MICRO: ABNORMAL
BASOPHILS # BLD: 0.04 K/UL (ref 0–0.2)
BASOPHILS NFR BLD: 1 % (ref 0–2)
BILIRUB SERPL-MCNC: 0.7 MG/DL (ref 0–1.2)
BILIRUB UR QL STRIP: ABNORMAL
BUN SERPL-MCNC: 16 MG/DL (ref 6–20)
CALCIUM SERPL-MCNC: 10.8 MG/DL (ref 8.6–10.2)
CHLORIDE SERPL-SCNC: 102 MMOL/L (ref 98–107)
CLARITY UR: CLEAR
CO2 SERPL-SCNC: 19 MMOL/L (ref 22–29)
COLOR UR: YELLOW
CREAT SERPL-MCNC: 1.2 MG/DL (ref 0.7–1.2)
EKG ATRIAL RATE: 83 BPM
EKG P AXIS: 60 DEGREES
EKG P-R INTERVAL: 156 MS
EKG Q-T INTERVAL: 358 MS
EKG QRS DURATION: 86 MS
EKG QTC CALCULATION (BAZETT): 420 MS
EKG R AXIS: 55 DEGREES
EKG T AXIS: 61 DEGREES
EKG VENTRICULAR RATE: 83 BPM
EOSINOPHIL # BLD: 0.15 K/UL (ref 0.05–0.5)
EOSINOPHILS RELATIVE PERCENT: 2 % (ref 0–6)
ERYTHROCYTE [DISTWIDTH] IN BLOOD BY AUTOMATED COUNT: 11.9 % (ref 11.5–15)
FLUAV RNA RESP QL NAA+PROBE: NOT DETECTED
FLUBV RNA RESP QL NAA+PROBE: NOT DETECTED
GFR, ESTIMATED: 81 ML/MIN/1.73M2
GLUCOSE SERPL-MCNC: 104 MG/DL (ref 74–99)
GLUCOSE UR STRIP-MCNC: NEGATIVE MG/DL
HCT VFR BLD AUTO: 46 % (ref 37–54)
HEMOCCULT SP1 STL QL: POSITIVE
HGB BLD-MCNC: 16.3 G/DL (ref 12.5–16.5)
HGB UR QL STRIP.AUTO: ABNORMAL
IMM GRANULOCYTES # BLD AUTO: 0.03 K/UL (ref 0–0.58)
IMM GRANULOCYTES NFR BLD: 0 % (ref 0–5)
KETONES UR STRIP-MCNC: 15 MG/DL
LACTATE BLDV-SCNC: 1.8 MMOL/L (ref 0.5–2.2)
LEUKOCYTE ESTERASE UR QL STRIP: NEGATIVE
LIPASE SERPL-CCNC: 20 U/L (ref 13–60)
LYMPHOCYTES NFR BLD: 0.63 K/UL (ref 1.5–4)
LYMPHOCYTES RELATIVE PERCENT: 8 % (ref 20–42)
MCH RBC QN AUTO: 30.7 PG (ref 26–35)
MCHC RBC AUTO-ENTMCNC: 35.4 G/DL (ref 32–34.5)
MCV RBC AUTO: 86.6 FL (ref 80–99.9)
MONOCYTES NFR BLD: 0.55 K/UL (ref 0.1–0.95)
MONOCYTES NFR BLD: 7 % (ref 2–12)
NEUTROPHILS NFR BLD: 82 % (ref 43–80)
NEUTS SEG NFR BLD: 6.43 K/UL (ref 1.8–7.3)
NITRITE UR QL STRIP: NEGATIVE
PH UR STRIP: 5.5 [PH] (ref 5–8)
PLATELET # BLD AUTO: 274 K/UL (ref 130–450)
PMV BLD AUTO: 11.2 FL (ref 7–12)
POTASSIUM SERPL-SCNC: 3.7 MMOL/L (ref 3.5–5)
PROT SERPL-MCNC: 8.7 G/DL (ref 6.4–8.3)
PROT UR STRIP-MCNC: NEGATIVE MG/DL
RBC # BLD AUTO: 5.31 M/UL (ref 3.8–5.8)
RBC #/AREA URNS HPF: ABNORMAL /HPF
SARS-COV-2 RNA RESP QL NAA+PROBE: NOT DETECTED
SODIUM SERPL-SCNC: 137 MMOL/L (ref 132–146)
SOURCE: NORMAL
SP GR UR STRIP: 1.02 (ref 1–1.03)
SPECIMEN DESCRIPTION: NORMAL
TROPONIN I SERPL HS-MCNC: <6 NG/L (ref 0–11)
UROBILINOGEN UR STRIP-ACNC: 0.2 EU/DL (ref 0–1)
WBC #/AREA URNS HPF: ABNORMAL /HPF
WBC OTHER # BLD: 7.8 K/UL (ref 4.5–11.5)

## 2025-04-10 PROCEDURE — 87493 C DIFF AMPLIFIED PROBE: CPT

## 2025-04-10 PROCEDURE — 87427 SHIGA-LIKE TOXIN AG IA: CPT

## 2025-04-10 PROCEDURE — 80053 COMPREHEN METABOLIC PANEL: CPT

## 2025-04-10 PROCEDURE — 82705 FATS/LIPIDS FECES QUAL: CPT

## 2025-04-10 PROCEDURE — 83690 ASSAY OF LIPASE: CPT

## 2025-04-10 PROCEDURE — 87329 GIARDIA AG IA: CPT

## 2025-04-10 PROCEDURE — 93010 ELECTROCARDIOGRAM REPORT: CPT | Performed by: INTERNAL MEDICINE

## 2025-04-10 PROCEDURE — 87425 ROTAVIRUS AG IA: CPT

## 2025-04-10 PROCEDURE — 96361 HYDRATE IV INFUSION ADD-ON: CPT

## 2025-04-10 PROCEDURE — 2580000003 HC RX 258: Performed by: EMERGENCY MEDICINE

## 2025-04-10 PROCEDURE — 93005 ELECTROCARDIOGRAM TRACING: CPT | Performed by: EMERGENCY MEDICINE

## 2025-04-10 PROCEDURE — 87636 SARSCOV2 & INF A&B AMP PRB: CPT

## 2025-04-10 PROCEDURE — 96374 THER/PROPH/DIAG INJ IV PUSH: CPT

## 2025-04-10 PROCEDURE — 99284 EMERGENCY DEPT VISIT MOD MDM: CPT

## 2025-04-10 PROCEDURE — 84484 ASSAY OF TROPONIN QUANT: CPT

## 2025-04-10 PROCEDURE — 85025 COMPLETE CBC W/AUTO DIFF WBC: CPT

## 2025-04-10 PROCEDURE — 87077 CULTURE AEROBIC IDENTIFY: CPT

## 2025-04-10 PROCEDURE — 87046 STOOL CULTR AEROBIC BACT EA: CPT

## 2025-04-10 PROCEDURE — 82270 OCCULT BLOOD FECES: CPT

## 2025-04-10 PROCEDURE — 6360000002 HC RX W HCPCS: Performed by: EMERGENCY MEDICINE

## 2025-04-10 PROCEDURE — 87324 CLOSTRIDIUM AG IA: CPT

## 2025-04-10 PROCEDURE — 83605 ASSAY OF LACTIC ACID: CPT

## 2025-04-10 PROCEDURE — 87449 NOS EACH ORGANISM AG IA: CPT

## 2025-04-10 PROCEDURE — 81001 URINALYSIS AUTO W/SCOPE: CPT

## 2025-04-10 PROCEDURE — 87045 FECES CULTURE AEROBIC BACT: CPT

## 2025-04-10 PROCEDURE — 6370000000 HC RX 637 (ALT 250 FOR IP): Performed by: EMERGENCY MEDICINE

## 2025-04-10 RX ORDER — 0.9 % SODIUM CHLORIDE 0.9 %
1000 INTRAVENOUS SOLUTION INTRAVENOUS ONCE
Status: COMPLETED | OUTPATIENT
Start: 2025-04-10 | End: 2025-04-10

## 2025-04-10 RX ORDER — ONDANSETRON 2 MG/ML
4 INJECTION INTRAMUSCULAR; INTRAVENOUS ONCE
Status: COMPLETED | OUTPATIENT
Start: 2025-04-10 | End: 2025-04-10

## 2025-04-10 RX ORDER — LOPERAMIDE HYDROCHLORIDE 2 MG/1
2 CAPSULE ORAL ONCE
Status: COMPLETED | OUTPATIENT
Start: 2025-04-10 | End: 2025-04-10

## 2025-04-10 RX ORDER — ACETAMINOPHEN 325 MG/1
650 TABLET ORAL ONCE
Status: COMPLETED | OUTPATIENT
Start: 2025-04-10 | End: 2025-04-10

## 2025-04-10 RX ADMIN — ONDANSETRON 4 MG: 2 INJECTION, SOLUTION INTRAMUSCULAR; INTRAVENOUS at 07:41

## 2025-04-10 RX ADMIN — SODIUM CHLORIDE 1000 ML: 0.9 INJECTION, SOLUTION INTRAVENOUS at 07:41

## 2025-04-10 RX ADMIN — LOPERAMIDE HYDROCHLORIDE 2 MG: 2 CAPSULE ORAL at 08:50

## 2025-04-10 RX ADMIN — ACETAMINOPHEN 650 MG: 325 TABLET ORAL at 08:50

## 2025-04-10 RX ADMIN — SODIUM CHLORIDE 1000 ML: 0.9 INJECTION, SOLUTION INTRAVENOUS at 08:45

## 2025-04-10 ASSESSMENT — LIFESTYLE VARIABLES: HOW OFTEN DO YOU HAVE A DRINK CONTAINING ALCOHOL: MONTHLY OR LESS

## 2025-04-10 NOTE — ED PROVIDER NOTES
[facility] EMERGENCY DEPARTMENT  EMERGENCY DEPARTMENT ENCOUNTER        Pt Name: Roel Madrigal  MRN: [id number]  Birthdate 1990  Date of evaluation: 4/10/2025  Provider: Izabella Russo DO  PCP: Babs Armas DO  Note Started: 6:50 AM EDT 4/10/25    CHIEF COMPLAINT       Chief Complaint   Patient presents with    Abdominal Pain    Back Pain     N/V/D  \"Ithink I have the flu\"  Passed out after taking a shower.       HISTORY OF PRESENT ILLNESS: 1 or more Elements   History From: patient    Limitations to history : None    Roel Madrigal is a 34 y.o. male who presents with nausea, emesis (no blood, no bile) and diarrhea (watery brown) beginning yesterday around 12 noon.  He states his girlfriend and her daughter had it the day before and it lasted about a day for them.  He states he does have it now.  He states he took Zofran yesterday without improvement and he feels so profoundly dehydrated that he feels like he passed out in the shower this morning trying to clean himself up from the diarrhea that he had this morning that was leaking out of him.  He reports he feels so dehydrated that he's kidneys hurt and he is not making any urine.  He does advise history of Crohn's disease but feels this is a flu and not related to an inflammatory illness.  Advises he has not had any Crohn's flare in years but is not on any medication either.  Denies recent antibiotic use, travel or surgeries.  Denies any recent trauma though he feels like he did pass out in the shower this morning, he states he feels he is just dehydrated and lowered himself to the ground after soiling himself from profound diarrhea.  The complaint has been persistent, moderate in severity, and worsened by nothing.  Patient denies fever/chills, sore throat, cough, congestion, chest pain, shortness of breath, edema, headache, visual disturbances, focal paresthesias, focal weakness, abdominal pain,  constipation, dysuria,

## 2025-04-11 ENCOUNTER — RESULTS FOLLOW-UP (OUTPATIENT)
Dept: EMERGENCY DEPT | Age: 35
End: 2025-04-11

## 2025-04-11 LAB
C DIFF GDH + TOXINS A+B STL QL IA.RAPID: ABNORMAL
C DIFFICILE TOXINS, PCR: POSITIVE
G LAMBLIA AG STL QL IA: NEGATIVE
ROTAVIRUS ANTIGEN: NEGATIVE
SOURCE, 60200063: NORMAL
SOURCE: NORMAL
SPECIMEN DESCRIPTION: ABNORMAL
SPECIMEN DESCRIPTION: ABNORMAL
SPECIMEN DESCRIPTION: NORMAL

## 2025-04-12 LAB
MICROORGANISM SPEC CULT: NORMAL
MICROORGANISM SPEC CULT: NORMAL
SPECIMEN DESCRIPTION: NORMAL

## 2025-04-13 LAB
FAT QUALITATIVE SPLIT STOOL: NORMAL
FECAL NEUTRAL FAT: NORMAL